# Patient Record
Sex: FEMALE | Race: WHITE | Employment: STUDENT | URBAN - NONMETROPOLITAN AREA
[De-identification: names, ages, dates, MRNs, and addresses within clinical notes are randomized per-mention and may not be internally consistent; named-entity substitution may affect disease eponyms.]

---

## 2017-09-07 ENCOUNTER — HOSPITAL ENCOUNTER (OUTPATIENT)
Dept: PHYSICAL THERAPY | Age: 27
Setting detail: THERAPIES SERIES
Discharge: HOME | End: 2017-09-07
Payer: COMMERCIAL

## 2017-09-07 PROCEDURE — 97161 PT EVAL LOW COMPLEX 20 MIN: CPT

## 2017-09-07 PROCEDURE — G8978 MOBILITY CURRENT STATUS: HCPCS

## 2017-09-07 PROCEDURE — 97110 THERAPEUTIC EXERCISES: CPT

## 2017-09-07 PROCEDURE — G8979 MOBILITY GOAL STATUS: HCPCS

## 2017-09-07 ASSESSMENT — PAIN SCALES - GENERAL
PAINLEVEL_OUTOF10: 1
PAINLEVEL_OUTOF10: 0

## 2017-09-07 ASSESSMENT — PAIN DESCRIPTION - ORIENTATION
ORIENTATION: RIGHT
ORIENTATION: RIGHT

## 2017-09-07 ASSESSMENT — PAIN DESCRIPTION - LOCATION
LOCATION: ANKLE
LOCATION: ANKLE

## 2017-09-07 ASSESSMENT — PAIN DESCRIPTION - DESCRIPTORS
DESCRIPTORS: DULL
DESCRIPTORS: DULL

## 2017-09-11 ENCOUNTER — HOSPITAL ENCOUNTER (OUTPATIENT)
Dept: PHYSICAL THERAPY | Age: 27
Setting detail: THERAPIES SERIES
Discharge: HOME | End: 2017-09-11
Payer: COMMERCIAL

## 2017-09-11 PROCEDURE — 97110 THERAPEUTIC EXERCISES: CPT

## 2017-09-13 ENCOUNTER — HOSPITAL ENCOUNTER (OUTPATIENT)
Dept: PHYSICAL THERAPY | Age: 27
Setting detail: THERAPIES SERIES
Discharge: HOME | End: 2017-09-13
Payer: COMMERCIAL

## 2017-09-13 PROCEDURE — 97140 MANUAL THERAPY 1/> REGIONS: CPT

## 2017-09-13 PROCEDURE — 97110 THERAPEUTIC EXERCISES: CPT

## 2017-09-13 ASSESSMENT — PAIN SCALES - GENERAL: PAINLEVEL_OUTOF10: 2

## 2017-09-13 ASSESSMENT — PAIN DESCRIPTION - LOCATION: LOCATION: ANKLE

## 2017-09-13 ASSESSMENT — PAIN DESCRIPTION - ORIENTATION: ORIENTATION: RIGHT

## 2017-09-15 ENCOUNTER — HOSPITAL ENCOUNTER (OUTPATIENT)
Dept: PHYSICAL THERAPY | Age: 27
Setting detail: THERAPIES SERIES
Discharge: HOME | End: 2017-09-15
Payer: COMMERCIAL

## 2017-09-15 PROCEDURE — G0283 ELEC STIM OTHER THAN WOUND: HCPCS

## 2017-09-15 PROCEDURE — 97140 MANUAL THERAPY 1/> REGIONS: CPT

## 2017-09-15 PROCEDURE — 97110 THERAPEUTIC EXERCISES: CPT

## 2017-09-15 ASSESSMENT — PAIN SCALES - GENERAL: PAINLEVEL_OUTOF10: 2

## 2017-09-15 ASSESSMENT — PAIN DESCRIPTION - ORIENTATION: ORIENTATION: RIGHT

## 2017-09-15 ASSESSMENT — PAIN DESCRIPTION - LOCATION: LOCATION: ANKLE

## 2017-09-18 ENCOUNTER — HOSPITAL ENCOUNTER (OUTPATIENT)
Dept: PHYSICAL THERAPY | Age: 27
Setting detail: THERAPIES SERIES
Discharge: HOME | End: 2017-09-18
Payer: COMMERCIAL

## 2017-09-18 PROCEDURE — G0283 ELEC STIM OTHER THAN WOUND: HCPCS

## 2017-09-18 PROCEDURE — 97140 MANUAL THERAPY 1/> REGIONS: CPT

## 2017-09-18 PROCEDURE — 97110 THERAPEUTIC EXERCISES: CPT

## 2017-09-18 ASSESSMENT — PAIN SCALES - GENERAL: PAINLEVEL_OUTOF10: 0

## 2017-09-19 ENCOUNTER — HOSPITAL ENCOUNTER (OUTPATIENT)
Dept: PHYSICAL THERAPY | Age: 27
Setting detail: THERAPIES SERIES
Discharge: HOME | End: 2017-09-19
Payer: COMMERCIAL

## 2017-09-19 PROCEDURE — G0283 ELEC STIM OTHER THAN WOUND: HCPCS

## 2017-09-19 PROCEDURE — 97110 THERAPEUTIC EXERCISES: CPT

## 2017-09-19 ASSESSMENT — PAIN SCALES - GENERAL: PAINLEVEL_OUTOF10: 2

## 2017-09-21 ENCOUNTER — APPOINTMENT (OUTPATIENT)
Dept: PHYSICAL THERAPY | Age: 27
End: 2017-09-21
Payer: COMMERCIAL

## 2017-09-25 ENCOUNTER — HOSPITAL ENCOUNTER (OUTPATIENT)
Dept: PHYSICAL THERAPY | Age: 27
Setting detail: THERAPIES SERIES
Discharge: HOME | End: 2017-09-25
Payer: COMMERCIAL

## 2017-09-25 PROCEDURE — 97110 THERAPEUTIC EXERCISES: CPT

## 2017-09-25 PROCEDURE — G0283 ELEC STIM OTHER THAN WOUND: HCPCS

## 2017-09-27 ENCOUNTER — HOSPITAL ENCOUNTER (OUTPATIENT)
Dept: PHYSICAL THERAPY | Age: 27
Setting detail: THERAPIES SERIES
Discharge: HOME | End: 2017-09-27
Payer: COMMERCIAL

## 2017-09-27 PROCEDURE — G0283 ELEC STIM OTHER THAN WOUND: HCPCS

## 2017-09-27 PROCEDURE — 97110 THERAPEUTIC EXERCISES: CPT

## 2017-09-27 PROCEDURE — 97140 MANUAL THERAPY 1/> REGIONS: CPT

## 2017-09-29 ENCOUNTER — HOSPITAL ENCOUNTER (OUTPATIENT)
Dept: PHYSICAL THERAPY | Age: 27
Setting detail: THERAPIES SERIES
Discharge: HOME | End: 2017-09-29
Payer: COMMERCIAL

## 2017-09-29 PROCEDURE — 97140 MANUAL THERAPY 1/> REGIONS: CPT

## 2017-09-29 PROCEDURE — 97110 THERAPEUTIC EXERCISES: CPT

## 2017-09-29 PROCEDURE — G0283 ELEC STIM OTHER THAN WOUND: HCPCS

## 2017-10-02 ENCOUNTER — HOSPITAL ENCOUNTER (OUTPATIENT)
Dept: PHYSICAL THERAPY | Age: 27
Setting detail: THERAPIES SERIES
Discharge: HOME | End: 2017-10-02
Payer: COMMERCIAL

## 2017-10-02 PROCEDURE — 97110 THERAPEUTIC EXERCISES: CPT

## 2017-10-02 PROCEDURE — G0283 ELEC STIM OTHER THAN WOUND: HCPCS

## 2017-10-02 NOTE — PROGRESS NOTES
met   Short term goal 2: Patient will demonstrate R ankle DF AROM 5 degrees past neutral in order to normalize gait pattern --met  []Met   []Partially met  []Not met      []Met   []Partially met  []Not met      []Met   []Partially met  []Not met     Long Term Goals - Time Frame for Long term goals : 4 weeks   Long term goal 1: Patient will demonstrate R ankle strength 4+/5 or greater in order to ease ambulating over uneven surfaces  []Met  []Partially met  []Not met   Long term goal 2: Patient will demonstrate R ankle AROM 0-45 degrees with knee extended in order to ease sport related activities  []Met  []Partially met  []Not met   Long term goal 3: Patient will demonstrate minimal to nil TTP around R calcaneofibular ligaments and tibiofibular ligaments in oder ease functional mobility -progressing []Met  []Partially met  []Not met   Long term goal 4: Patient will demonstrate the ability to perform agility/running activities for 15 minutes or greater with reports of 2/10 or less R ankle pain in order to return to playing soccer -progressing []Met  []Partially met  []Not met     []Met  []Partially met  []Not met       Minutes Tracking:  Time In: 4458  Time Out: Mikhail 72  Minutes: 70    Jose Baca Date: 10/2/2017

## 2017-10-04 ENCOUNTER — HOSPITAL ENCOUNTER (OUTPATIENT)
Dept: PHYSICAL THERAPY | Age: 27
Setting detail: THERAPIES SERIES
Discharge: HOME | End: 2017-10-04
Payer: COMMERCIAL

## 2017-10-04 PROCEDURE — G0283 ELEC STIM OTHER THAN WOUND: HCPCS

## 2017-10-04 PROCEDURE — 97140 MANUAL THERAPY 1/> REGIONS: CPT

## 2017-10-04 PROCEDURE — 97110 THERAPEUTIC EXERCISES: CPT

## 2017-10-06 ENCOUNTER — HOSPITAL ENCOUNTER (OUTPATIENT)
Dept: PHYSICAL THERAPY | Age: 27
Setting detail: THERAPIES SERIES
Discharge: HOME | End: 2017-10-06
Payer: COMMERCIAL

## 2017-10-06 PROCEDURE — 97110 THERAPEUTIC EXERCISES: CPT

## 2017-10-06 PROCEDURE — G0283 ELEC STIM OTHER THAN WOUND: HCPCS

## 2017-10-06 NOTE — PROGRESS NOTES
Phone: [facility]           Fax: [phone]                           Outpatient Physical Therapy                                                                            Daily Note    Patient: Willian Timmons : 1990  CSN #: [id number]   Referring Practitioner:  Dr. Tom Marin     Referral Date : 17     Date: 10/6/2017    Diagnosis: closed nondisplaced fracture of medial malleolus (S82.54XA), sprain of tibiofibular ligament right ankle (K87.201G), sprain of calcaneofibular ligment of right ankle (D17.069B)  Treatment Diagnosis: R ankle sprain     Onset Date: 17  PT Insurance Information: BCBS  Total # of Visits Approved: 18 Per Physician Order  Total # of Visits to Date: 11  No Show: 0      Pre-Treatment Pain:  0/10  Subjective: Pt denies current pain. Pt states she didnt have any pain following last therapy session. Exercises:     Exercise 2: airex 3way heel raises x15 (steps today)   Exercise 3: Baps board standing Level 3- PF/DF, CW/CCW [id number]      Exercise 5: Ellipitical 10min L^ (fwd 5 retro5)        Exercise 8: FSU/LSU/SD 8 in  x 15  Exercise 9: Squats on bosu ball x 15 /  balance and catch ball      Exercise 11: Leg Press DL 14pl  / SL 9pl  Exercise 12: SLS Blue Foam 30s x 3  Exercise 13: walking lunges 3 laps HEEL TO TOE WALK 3 LAPS --hold 5# ea hand   Exercise 14: TG mini jump L5 15x   Exercise 15: Retro TDM  8min 7% 2.5mph   Exercise 16: golfer  5# 10x      Modalities:      Cryotherapy (Minutes\Location): R ankle 15 minutes        E-stim (parameters): IFC + CP for post exercise pain/edema control        Assessment  Assessment: Agility ladder/ toe jumps/ light agility tolerated today 15+ mins with no pain reported R ankle. No palpable tenderness reported today with palpation of R lateral ankle. Functional strength displayed with ability to heel raise 6 pl on leg press 26x on R compared to 30 on L.   Open chain R ankle MMT 5/5 PF, 5/5 DF. 4+/5 INV, Eversion 4+/5. Pt to d/c from therapy d/t all goal being met. Patient Education  Reviewed ex for d/c.   Pt verbalized/demonstrated good understanding:     [x] Yes         [] No, pt required further clarification.     Post Treatment Pain:  0/10      Plan  Times per week: 3x/week   Plan weeks: 4 weeks       Goals  (Total # of Visits to Date: 6)   Short Term Goals - Time Frame for Short term goals: 2 weeks      Short term goal 1: Initiate HEP with good understanding-met                                         [x]Met   []Partially met  []Not met   Short term goal 2: Patient will demonstrate R ankle DF AROM 5 degrees past neutral in order to normalize gait pattern --met  [x]Met   []Partially met  []Not met      []Met   []Partially met  []Not met      []Met   []Partially met  []Not met     Long Term Goals - Time Frame for Long term goals : 4 weeks   Long term goal 1: Patient will demonstrate R ankle strength 4+/5 or greater in order to ease ambulating over uneven surfaces -MET [x]Met  []Partially met  []Not met   Long term goal 2: Patient will demonstrate R ankle AROM 0-45 degrees with knee extended in order to ease sport related activities _MET [x]Met  []Partially met  []Not met   Long term goal 3: Patient will demonstrate minimal to nil TTP around R calcaneofibular ligaments and tibiofibular ligaments in oder ease functional mobility -MET [x]Met  []Partially met  []Not met   Long term goal 4: Patient will demonstrate the ability to perform agility/running activities for 15 minutes or greater with reports of 2/10 or less R ankle pain in order to return to playing soccer -MET [x]Met  []Partially met  []Not met     []Met  []Partially met  []Not met       Minutes Tracking:  Time In: 1430  Time Out: Parish 70  Minutes: 100 Vivi Aldridge PTA    Date: 10/6/2017

## 2018-02-12 ENCOUNTER — HOSPITAL ENCOUNTER (OUTPATIENT)
Dept: PHYSICAL THERAPY | Age: 28
Setting detail: THERAPIES SERIES
Discharge: HOME | End: 2018-02-12
Payer: COMMERCIAL

## 2018-02-12 PROCEDURE — 97161 PT EVAL LOW COMPLEX 20 MIN: CPT

## 2018-02-12 PROCEDURE — G8984 CARRY CURRENT STATUS: HCPCS

## 2018-02-12 PROCEDURE — G8985 CARRY GOAL STATUS: HCPCS

## 2018-02-12 PROCEDURE — 97110 THERAPEUTIC EXERCISES: CPT

## 2018-02-12 NOTE — PROGRESS NOTES
Phone: [address]          Fax: [phone]                      Outpatient Physical Therapy                                                                      Evaluation  Date: 2018  Patient: Judythe Runner  : 1990  CSN #: [id number]  Referring Practitioner: Masood Watson. Judie Hernandez MD    Referral Date : 18     Diagnosis: Right biceps tendonitis, M75.21    Treatment Diagnosis: Right biceps tendonitis  Onset Date: 10/12/09  PT Insurance Information: Pradeep Palafox  Total # of Visits Approved: 18   Total # of Visits to Date: 1  No Show: 0  Canceled Appointment: 0     Subjective  Subjective: Patient reports injuring her shoulders 8 years ago when grabbing for her dogs collar, jerking her shoulder forward and across her body. 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 Patient reports pain ranges from 4/10 to 9/10 at worst.  Aggravating factors include: front and lateral deltoid raises, sleeping, standing/walking for long periods with arm at her side. Patient reports relief following steriod dose pack.   Additional Pertinent Hx: History of bilateral knee scopes, right medial malleolus fracture             Objective     Observation/Palpation  Posture: Fair  Palpation: Patient exquisitely tender at right biceps tendon, right subscapularis, latissimus dorsi, and infraspinatus  Body Mechanics: Patient sits with slouched, rounded shoulder posture  Spine  Cervical: WNL  Joint Mobility  Spine: Decreased upper thoracic joint mobility  Strength RUE  Strength RUE: Exception  R Shoulder Flexion: 4/5  R Shoulder ABduction: 4/5  R Shoulder Internal Rotation: 4/5  R Shoulder External Rotation: 3+/5  R Elbow Flexion: 4+/5     Additional Measures  Special Tests: +belly press, +Karime's, audible clunk when moving from IR to ER     Assessment  Body structures, Functions, Activity limitations: Decreased ROM, Decreased strength, Decreased endurance  Assessment: The patient is a 24 y.o. female who reports a long standing history of right shoulder pain which became severe over the past 2 weeks when progressing her training. Patient presents with a possible labral pathology and was tender to palpation and to resistance of rotator cuff musculature. Patient also presents with impaired right shoulder ROM and strength. Patient would benefit from skilled physical therapy for modalities, manual techniques, and an exercise progression to decrease pain and improve overall shoulder strength and stability  Prognosis: Fair        Decision Making: Low Complexity    Patient Education  Patient educated on POC and exercise rationale  Pt verbalized/demonstrated good understanding:     [x] Yes         [] No, pt required further clarification. [] Primary Impairment :   G Code:    [] Mobility         [x] Carry        [] Body Position       [] Self Care      [] Other:   Functional Impairment Current:  [] 0%    [] 1-19% [x] 20-39% [] 40-59% [] 60-79%    [] 80-99% [] 100%  Functional Impairment Goal:  [] 0%    [x] 1-19% [] 20-39% [] 40-59% [] 60-79%    [] 80-99% [] 100%  G Code Functional Impairment determined by:  [x] Clinical Judgment   [] Outcome Measure:     Goals  Short term goals  Time Frame for Short term goals: 3 weeks  Short term goal 1: Patient will be initiated with a HEP  Short term goal 2: Patient will report a 25% reduction in pain levels. Long term goals  Time Frame for Long term goals : 6 weeks  Long term goal 1: Patient will improve right shoulder strength to 5/5 for ADLs.     Long term goal 2: Patient will improve right shoulder external rotation ROM to 90 degrees for ADL performance  Long term goal 3: Patient will report decreased pain to <4/10 at worst.      Patient goals : Decrease pain and improve right shoulder strength        Minutes Tracking:  Time In: 1605  Time Out: [address]  Minutes: [address] [place], [place],

## 2018-02-12 NOTE — PLAN OF CARE
[facility]           Phone: [phone]             Outpatient Physical Therapy  Fax: [phone]                                           Date: 2018  Patient: Jefferson Bland : 1990 [facility] #: [id number]   Referring Practitioner:  Jovanna Morales MD Referral Date:  18       [x] Plan of Care   [] Updated Plan of Care    Dates of Service to Include: 2018 to 18    Diagnosis:  Right biceps tendonitis, M75.21    Rehab (Treatment) Diagnosis:  Right biceps tendonitis             Onset Date:  10/12/09    Attendance  Total # of Visits to Date: 1 No Show: 0 Canceled Appointment: 0    Assessment  Body structures, Functions, Activity limitations: Decreased ROM, Decreased strength, Decreased endurance  Assessment: The patient is a 24 y.o. female who reports a long standing history of right shoulder pain which became severe over the past 2 weeks when progressing her training. Patient presents with a possible labral pathology and was tender to palpation and to resistance of rotator cuff musculature. Patient also presents with impaired right shoulder ROM and strength.  Patient would benefit from skilled physical therapy for modalities, manual techniques, and an exercise progression to decrease pain and improve overall shoulder strength and stability    [] Primary Impairment :   G Code:    [] Mobility         [x] Carry        [] Body Position       [] Self Care      [] Other:   Functional Impairment Current:  [] 0%    [] 1-19% [x] 20-39% [] 40-59% [] 60-79%    [] 80-99% [] 100%  Functional Impairment Goal:  [] 0%    [x] 1-19% [] 20-39% [] 40-59% [] 60-79%    [] 80-99% [] 100%  G Code Functional Impairment determined by:  [x] Clinical Judgment   [] Outcome Measure:     Goals  Short term goals  Time Frame for Short term goals: 3 weeks  Short term goal 1: Patient will be initiated with a HEP  Short term goal
PT/OT INITIAL EVALUATION REPORT   Waldo Acevedo Date: 2/12/2018     Insurance Company:___________  Patient Name: Daniela Zapata                                          Patient ID #: __________________   Date of Birth / Age: 1990/ 24 y.o. Date Of Injury:  /  /   Date Of Surgery:  /  /   ICD-10 Code(s): ___M75.21______________ Diagnosis: Right biceps tendonitis, M75.21  Referring Practitioner: Stanislaw Grace. Suzanne Ring MD  Referring Physician ID #:_______________   Therapy Office: [address] [place] [address]: []PT / []OT       OBJECTIVE FINDINGS    Involved Region: []Left / [x]Right / []N/A  Shoulder    Strength ( 0-5 )       Range of Motion   Motion  /  Grade     PROM    AROM    Right shoulder flexion 4/5    R shoulder flexion: 152    Right shoulder abduction 4/5    R shoulder abduction: 180    Right shoulder internal rotation 4/5    R shoulder external rotation: 75   Right shoulder external rotation 3+/5    Right elbow flexion: 4+/5    Right shoulder internal rotation 70     How / Where Injury Occurred: Patient reports injuring her shoulders 8 years ago when grabbing for her dogs collar, jerking her shoulder forward and across her body. She reports she's had pain on and off since, but pain became severe 2 weeks when exercising. Patient reports pain ranges from 4/10 to 9/10 at worst.  Aggravating factors include: front and lateral deltoid raises, sleeping, standing/walking for long periods with arm at her side. She reports relief following steriod dose pack. Work Related?   []Yes  [x]No      Pertinent History: Additional Pertinent Hx: History of bilateral knee scopes, right medial malleolus fracture    Pain:  Pain Scale:  9 /10     Nature: []constant / [x]intermittent / []localized / []radiating     Functional Deficits / Additional Information:   The patient is a 24 y.o. female who reports a long standing history of right shoulder pain which became severe over the past 2 weeks
Patient will report a 25% reduction in pain levels. Long term goals  Time Frame for Long term goals : 6 weeks  Long term goal 1: Patient will improve right shoulder strength to 5/5 for ADLs.     Long term goal 2: Patient will improve right shoulder external rotation ROM to 90 degrees for ADL performance  Long term goal 3: Patient will report decreased pain to <4/10 at worst.    Prognosis  Prognosis: [address]   Times per week: 3  Plan weeks: 6  [x]HP/CP      [x]Electrical Stim   [x]Therapeutic Exercise      []Gait Training  []Aquatics   [x]Ultrasound         [x]Patient Education/HEP   [x]Manual Therapy  []Traction    [x]Neuro-devin        [x]Soft Tissue Mobs            []Home TENS  []Iontophoresis    []Orthotic casting/fitting      []Dry Needling             Electronically signed by: Sandy Haley PT, DPT    Date: 2/12/2018      ______________________________________ Date: 2/12/2018   Physician Signature

## 2018-02-15 ENCOUNTER — HOSPITAL ENCOUNTER (OUTPATIENT)
Dept: PHYSICAL THERAPY | Age: 28
Setting detail: THERAPIES SERIES
Discharge: HOME | End: 2018-02-15
Payer: COMMERCIAL

## 2018-02-15 PROCEDURE — 97110 THERAPEUTIC EXERCISES: CPT

## 2018-02-15 PROCEDURE — 97035 APP MDLTY 1+ULTRASOUND EA 15: CPT

## 2018-02-16 ENCOUNTER — HOSPITAL ENCOUNTER (OUTPATIENT)
Dept: PHYSICAL THERAPY | Age: 28
Setting detail: THERAPIES SERIES
Discharge: HOME | End: 2018-02-16
Payer: COMMERCIAL

## 2018-02-16 PROCEDURE — 97110 THERAPEUTIC EXERCISES: CPT

## 2018-02-16 PROCEDURE — 97140 MANUAL THERAPY 1/> REGIONS: CPT

## 2018-02-16 PROCEDURE — 97035 APP MDLTY 1+ULTRASOUND EA 15: CPT

## 2018-02-16 NOTE — PROGRESS NOTES
clarification. Post Treatment Pain:  1/10      Plan  Times per week: 3  Plan weeks: 6      Goals  (Total # of Visits to Date: 3)   Short Term Goals - Time Frame for Short term goals: 3 weeks    Short term goal 1: Patient will be initiated with a HEP- MET                                        [x]Met   []Partially met  []Not met   Short term goal 2: Patient will report a 25% reduction in pain levels - MET  [x]Met   []Partially met  []Not met      []Met   []Partially met  []Not met      []Met   []Partially met  []Not met     Long Term Goals - Time Frame for Long term goals : 6 weeks  Long term goal 1: Patient will improve right shoulder strength to 5/5 for ADLs.    []Met  []Partially met  []Not met   Long term goal 2: Patient will improve right shoulder external rotation ROM to 90 degrees for ADL performance []Met  []Partially met  []Not met   Long term goal 3: Patient will report decreased pain to <4/10 at worst. []Met  []Partially met  []Not met     []Met  []Partially met  []Not met     []Met  []Partially met  []Not met       Minutes Tracking:  Time In: 1145  Time Out: Aman Devi  Minutes: 450 NADER Stanley PT, DPT Date: 2/16/2018

## 2018-02-19 ENCOUNTER — HOSPITAL ENCOUNTER (OUTPATIENT)
Dept: PHYSICAL THERAPY | Age: 28
Setting detail: THERAPIES SERIES
Discharge: HOME | End: 2018-02-19
Payer: COMMERCIAL

## 2018-02-19 PROCEDURE — 97110 THERAPEUTIC EXERCISES: CPT

## 2018-02-19 PROCEDURE — 97140 MANUAL THERAPY 1/> REGIONS: CPT

## 2018-02-19 PROCEDURE — 97035 APP MDLTY 1+ULTRASOUND EA 15: CPT

## 2018-02-19 PROCEDURE — G0283 ELEC STIM OTHER THAN WOUND: HCPCS

## 2018-02-21 ENCOUNTER — HOSPITAL ENCOUNTER (OUTPATIENT)
Dept: PHYSICAL THERAPY | Age: 28
Setting detail: THERAPIES SERIES
Discharge: HOME | End: 2018-02-21
Payer: COMMERCIAL

## 2018-02-21 PROCEDURE — 97035 APP MDLTY 1+ULTRASOUND EA 15: CPT

## 2018-02-21 PROCEDURE — G0283 ELEC STIM OTHER THAN WOUND: HCPCS

## 2018-02-21 PROCEDURE — 97110 THERAPEUTIC EXERCISES: CPT

## 2018-02-22 NOTE — PROGRESS NOTES
Phone: [facility]           Fax: [phone]                           Outpatient Physical Therapy                                                                            Daily Note    Patient: Jami Prader : 1990  CSN #: [id number]   Referring Practitioner:  Racheal Dunaway. Uyen Mclean MD    Referral Date : 18     Date: 2018    Diagnosis: Right biceps tendonitis, M75.21  Treatment Diagnosis: Right biceps tendonitis    Onset Date: 10/12/09  PT Insurance Information: Win Acosta  Total # of Visits Approved: 18 Per Physician Order  Total # of Visits to Date: 5  No Show: 0  Canceled Appointment: 0      Pre-Treatment Pain:  5/10  Subjective: Pt states should pain/soreness is 2-3/10. Sore from exercise class this morning doing wind up squats. Pt reports celi. last session without c/o pain afterwards. Exercises:     Exercise 2: T-band --IR/ER shld exten and rows 2x10 ea --red   Exercise 3: sidlying ER 3# 15x2 ,  Exercise 4: joystick--smal circles cw/ccw  Exercise 5: UBE, 90rpm, 3min fwd/3 min backward  Exercise 6: shoulder extension with stick 2x10  Exercise 7: Pulley: shoulder flexion x 2 min  Exercise 8: Therapy ball roll up wall x 30seconds  Exercise 9: Posterior capsule stretch 2x30 seconds                                                         Modalities:          Ultrasound: ant shld 1mhz, 0.8 w/cm2--8 min    E-stim (parameters): ifc/cp for pain                  Assessment  Assessment: Pain about a 4-5/10. Still having anterior shld pain. RTC and scapular strengthening tolerated well with mild progressions. Strength blimited to 4-/4/5 due to pain. Impingment zones reviweded     Patient Education  Impingement zones   Pt verbalized/demonstrated good understanding:     [x] Yes         [] No, pt required further clarification.     Post Treatment Pain:  4/10      Plan  Times per week: 3  Plan weeks: 6      Goals  (Total # of Visits to Date: 5)   Short Term

## 2018-02-23 ENCOUNTER — HOSPITAL ENCOUNTER (OUTPATIENT)
Dept: PHYSICAL THERAPY | Age: 28
Setting detail: THERAPIES SERIES
Discharge: HOME | End: 2018-02-23
Payer: COMMERCIAL

## 2018-02-23 PROCEDURE — 97110 THERAPEUTIC EXERCISES: CPT

## 2018-02-23 PROCEDURE — G0283 ELEC STIM OTHER THAN WOUND: HCPCS

## 2018-02-23 NOTE — PROGRESS NOTES
Phone: [facility]           Fax: [phone]                           Outpatient Physical Therapy                                                                            Daily Note    Patient: Ciro Kohler : 1990  CSN #: [id number]   Referring Practitioner:  Saira Handy. Adele Lofton MD    Referral Date : 18     Date: 2018    Diagnosis: Right biceps tendonitis, M75.21  Treatment Diagnosis: Right biceps tendonitis    Onset Date: 10/12/09  PT Insurance Information: Paris Do  Total # of Visits Approved: 18 Per Physician Order  Total # of Visits to Date: 6  No Show: 0  Canceled Appointment: 0      Pre-Treatment Pain:  3/10  Subjective: Patient reports soreness from rolling on her shoulder this morning. She reports 2-3/10 pain coming into therapy. Exercises:     Exercise 2: T-band --IR/ER shld exten and rows 2x10 ea --red      Exercise 4: joystick--smal circles cw/ccw     Exercise 6: shoulder extension with stick 2x10  Exercise 7: Pulley: shoulder flexion x 2 min  Exercise 8: Therapy ball roll up wall x 30seconds     Exercise 10: Median nerve glide x10  Exercise 11: SciFit, UE only, 5 min, level 2  Exercise 12: Knee plank, 2x30 seconds, arms extended  Exercise 13: [place] carry, 10# (2) 3x50'    Modalities:              E-stim (parameters): ifc/cp for pain x15 minutes                 Assessment  Body structures, Functions, Activity limitations: Decreased ROM, Decreased strength, Decreased endurance  Assessment: Patient progressed with planks to improve shoulder proprioception. Patient reported burning sensation with most exercises which abolished with rest.  Used E-Stim and ice to decrease discomfort    Patient Education  Patient educated on exercise rational  Pt verbalized/demonstrated good understanding:     [x] Yes         [] No, pt required further clarification.     Post Treatment Pain:  1/10      Plan  Times per week: 3  Plan weeks: 6      Goals

## 2018-02-26 ENCOUNTER — HOSPITAL ENCOUNTER (OUTPATIENT)
Dept: PHYSICAL THERAPY | Age: 28
Setting detail: THERAPIES SERIES
Discharge: HOME | End: 2018-02-26
Payer: COMMERCIAL

## 2018-02-26 PROCEDURE — G0283 ELEC STIM OTHER THAN WOUND: HCPCS

## 2018-02-26 PROCEDURE — 97110 THERAPEUTIC EXERCISES: CPT

## 2018-02-26 PROCEDURE — 97140 MANUAL THERAPY 1/> REGIONS: CPT

## 2018-02-26 ASSESSMENT — PAIN SCALES - GENERAL: PAINLEVEL_OUTOF10: 1

## 2018-02-26 NOTE — PROGRESS NOTES
stim tolerated well. Patient Education  Reviewed HEP and proper body posture during studying  Pt verbalized/demonstrated good understanding:     [x] Yes         [] No, pt required further clarification. Post Treatment Pain:  1/10      Plan  Times per week: 3  Plan weeks: 6      Goals  (Total # of Visits to Date: 7)   Short Term Goals - Time Frame for Short term goals: 3 weeks     Short term goal 1: Patient will be initiated with a HEP- MET                                        []Met   []Partially met  []Not met   Short term goal 2: Patient will report a 25% reduction in pain levels - MET  []Met   []Partially met  []Not met      []Met   []Partially met  []Not met      []Met   []Partially met  []Not met     Long Term Goals - Time Frame for Long term goals : 6 weeks  Long term goal 1: Patient will improve right shoulder strength to 5/5 for ADLs.    []Met  []Partially met  []Not met   Long term goal 2: Patient will improve right shoulder external rotation ROM to 90 degrees for ADL performance []Met  []Partially met  []Not met   Long term goal 3: Patient will report decreased pain to <4/10 at worst. []Met  []Partially met  []Not met     []Met  []Partially met  []Not met     []Met  []Partially met  []Not met       Minutes Tracking:  Time In: 1500  Time Out: [address] [facility]  Minutes: 75    Jose Jackson PTA Date: 2/26/2018

## 2018-02-28 ENCOUNTER — HOSPITAL ENCOUNTER (OUTPATIENT)
Dept: PHYSICAL THERAPY | Age: 28
Setting detail: THERAPIES SERIES
Discharge: HOME | End: 2018-02-28
Payer: COMMERCIAL

## 2018-02-28 PROCEDURE — G0283 ELEC STIM OTHER THAN WOUND: HCPCS

## 2018-02-28 PROCEDURE — 97035 APP MDLTY 1+ULTRASOUND EA 15: CPT

## 2018-02-28 PROCEDURE — 97110 THERAPEUTIC EXERCISES: CPT

## 2018-02-28 ASSESSMENT — PAIN SCALES - GENERAL: PAINLEVEL_OUTOF10: 2

## 2018-03-01 ENCOUNTER — HOSPITAL ENCOUNTER (OUTPATIENT)
Dept: PHYSICAL THERAPY | Age: 28
Setting detail: THERAPIES SERIES
Discharge: HOME | End: 2018-03-01
Payer: COMMERCIAL

## 2018-03-01 PROCEDURE — G0283 ELEC STIM OTHER THAN WOUND: HCPCS

## 2018-03-01 PROCEDURE — 97110 THERAPEUTIC EXERCISES: CPT

## 2018-03-02 ENCOUNTER — APPOINTMENT (OUTPATIENT)
Dept: PHYSICAL THERAPY | Age: 28
End: 2018-03-02
Payer: COMMERCIAL

## 2018-03-05 ENCOUNTER — APPOINTMENT (OUTPATIENT)
Dept: PHYSICAL THERAPY | Age: 28
End: 2018-03-05
Payer: COMMERCIAL

## 2018-03-07 ENCOUNTER — APPOINTMENT (OUTPATIENT)
Dept: PHYSICAL THERAPY | Age: 28
End: 2018-03-07
Payer: COMMERCIAL

## 2018-03-09 ENCOUNTER — APPOINTMENT (OUTPATIENT)
Dept: PHYSICAL THERAPY | Age: 28
End: 2018-03-09
Payer: COMMERCIAL

## 2018-03-12 ENCOUNTER — APPOINTMENT (OUTPATIENT)
Dept: PHYSICAL THERAPY | Age: 28
End: 2018-03-12
Payer: COMMERCIAL

## 2018-03-14 ENCOUNTER — HOSPITAL ENCOUNTER (OUTPATIENT)
Dept: PHYSICAL THERAPY | Age: 28
Setting detail: THERAPIES SERIES
Discharge: HOME | End: 2018-03-14
Payer: COMMERCIAL

## 2018-03-14 PROCEDURE — 97140 MANUAL THERAPY 1/> REGIONS: CPT

## 2018-03-14 PROCEDURE — G0283 ELEC STIM OTHER THAN WOUND: HCPCS

## 2018-03-14 PROCEDURE — 97110 THERAPEUTIC EXERCISES: CPT

## 2018-03-16 ENCOUNTER — HOSPITAL ENCOUNTER (OUTPATIENT)
Dept: PHYSICAL THERAPY | Age: 28
Setting detail: THERAPIES SERIES
Discharge: HOME | End: 2018-03-16
Payer: COMMERCIAL

## 2018-03-16 PROCEDURE — G0283 ELEC STIM OTHER THAN WOUND: HCPCS

## 2018-03-16 PROCEDURE — 97035 APP MDLTY 1+ULTRASOUND EA 15: CPT

## 2018-03-16 PROCEDURE — 97140 MANUAL THERAPY 1/> REGIONS: CPT

## 2018-03-16 PROCEDURE — 97110 THERAPEUTIC EXERCISES: CPT

## 2018-03-19 ENCOUNTER — HOSPITAL ENCOUNTER (OUTPATIENT)
Dept: PHYSICAL THERAPY | Age: 28
Setting detail: THERAPIES SERIES
Discharge: HOME | End: 2018-03-19
Payer: COMMERCIAL

## 2018-03-19 PROCEDURE — 97035 APP MDLTY 1+ULTRASOUND EA 15: CPT

## 2018-03-19 PROCEDURE — 97110 THERAPEUTIC EXERCISES: CPT

## 2018-03-19 PROCEDURE — G0283 ELEC STIM OTHER THAN WOUND: HCPCS

## 2018-03-19 PROCEDURE — 97140 MANUAL THERAPY 1/> REGIONS: CPT

## 2018-03-19 ASSESSMENT — PAIN SCALES - GENERAL: PAINLEVEL_OUTOF10: 8

## 2018-03-19 NOTE — PROGRESS NOTES
stretches. Pt verbalized/demonstrated good understanding:     [x] Yes         [] No, pt required further clarification. Post Treatment Pain:  4/10      Plan  Times per week: 3  Plan weeks: 6      Goals  (Total # of Visits to Date: 15)   Short Term Goals - Time Frame for Short term goals: 3 weeks     Short term goal 1: Patient will be initiated with a HEP- MET                                        []Met   []Partially met  []Not met   Short term goal 2: Patient will report a 25% reduction in pain levels - MET  []Met   []Partially met  []Not met      []Met   []Partially met  []Not met      []Met   []Partially met  []Not met     Long Term Goals - Time Frame for Long term goals : 6 weeks  Long term goal 1: Patient will improve right shoulder strength to 5/5 for ADLs.    []Met  []Partially met  []Not met   Long term goal 2: Patient will improve right shoulder external rotation ROM to 90 degrees for ADL performance []Met  []Partially met  []Not met   Long term goal 3: Patient will report decreased pain to <4/10 at worst. []Met  []Partially met  []Not met     []Met  []Partially met  []Not met     []Met  []Partially met  []Not met       Minutes Tracking:  Time In: 1200  Time Out: 1309  Minutes: Jay Funes ,PTA Date: 3/19/2018

## 2018-03-21 ENCOUNTER — HOSPITAL ENCOUNTER (OUTPATIENT)
Dept: PHYSICAL THERAPY | Age: 28
Setting detail: THERAPIES SERIES
Discharge: HOME | End: 2018-03-21
Payer: COMMERCIAL

## 2018-03-21 PROCEDURE — 97140 MANUAL THERAPY 1/> REGIONS: CPT

## 2018-03-21 PROCEDURE — 97035 APP MDLTY 1+ULTRASOUND EA 15: CPT

## 2018-03-21 PROCEDURE — 97110 THERAPEUTIC EXERCISES: CPT

## 2018-03-21 PROCEDURE — G0283 ELEC STIM OTHER THAN WOUND: HCPCS

## 2018-03-21 NOTE — PROGRESS NOTES
Phone: [facility]           Fax: [phone]                           Outpatient Physical Therapy                                                                            Daily Note    Patient: Elba Huntley : 1990  CSN #: [id number]   Referring Practitioner:  Marito Peterson. Rolando Venegas MD    Referral Date : 18     Date: 3/21/2018    Diagnosis: Right biceps tendonitis, M75.21  Treatment Diagnosis: Right biceps tendonitis    Onset Date: 10/12/09  PT Insurance Information: Hershall Barthel  Total # of Visits Approved: 18 Per Physician Order  Total # of Visits to Date: 13  No Show: 0  Canceled Appointment: 0      Pre-Treatment Pain:  3/10  Subjective: Pt states she is feeling alot better today but still has pain. Pt to RTD following therapy today. Current R shld pain 3/10. Exercises:  Exercise 1: HEP: Posterior capsule stretch in pain-free ROM, therapy ball circles x 30 each, therapy ball rollout x10  Exercise 2: T-band exten and rows 2x15 ea -- Green    Exercise 3: HAB/ Diags yTB 15x ea   Exercise 4: joystick--small circles cw/ccw, push/pull x 20 each  Exercise 5: UBE, 90rpm, 4min fwd/4 min backward - backward only today       Exercise 10: Median nerve glide x10       Manual:     PROM: R shld PROM./ R pec stretch      Modalities:      Ultrasound: ant shld 1mhz, 0.8 w/cm2--8 min    E-stim (parameters): ifc/cp for pain x15 minutes        Assessment  Assessment: Pt current R shld Flex AROM 163* with 4+/5 MMT, ABD AROM 175* with 4/5 MMT. Pt with full R shld PROM. Patient Education  Cont current stretches/ Cont HEP. Pt verbalized/demonstrated good understanding:     [x] Yes         [] No, pt required further clarification.     Post Treatment Pain:  3/10      Plan  Times per week: 3  Plan weeks: 6      Goals  (Total # of Visits to Date: 15)   Short Term Goals - Time Frame for Short term goals: 3 weeks     Short term goal 1: Patient will be initiated with a HEP-

## 2018-03-23 ENCOUNTER — APPOINTMENT (OUTPATIENT)
Dept: PHYSICAL THERAPY | Age: 28
End: 2018-03-23
Payer: COMMERCIAL

## 2018-03-26 ENCOUNTER — APPOINTMENT (OUTPATIENT)
Dept: PHYSICAL THERAPY | Age: 28
End: 2018-03-26
Payer: COMMERCIAL

## 2018-03-28 ENCOUNTER — HOSPITAL ENCOUNTER (OUTPATIENT)
Dept: PHYSICAL THERAPY | Age: 28
Setting detail: THERAPIES SERIES
End: 2018-03-28
Payer: COMMERCIAL

## 2018-03-30 ENCOUNTER — APPOINTMENT (OUTPATIENT)
Dept: PHYSICAL THERAPY | Age: 28
End: 2018-03-30
Payer: COMMERCIAL

## 2018-04-19 ENCOUNTER — ANESTHESIA EVENT (OUTPATIENT)
Dept: OPERATING ROOM | Age: 28
End: 2018-04-19
Payer: COMMERCIAL

## 2018-04-20 ENCOUNTER — ANESTHESIA (OUTPATIENT)
Dept: OPERATING ROOM | Age: 28
End: 2018-04-20
Payer: COMMERCIAL

## 2018-04-20 ENCOUNTER — HOSPITAL ENCOUNTER (OUTPATIENT)
Age: 28
Setting detail: OUTPATIENT SURGERY
Discharge: HOME | End: 2018-04-20
Attending: ORTHOPAEDIC SURGERY | Admitting: ORTHOPAEDIC SURGERY
Payer: COMMERCIAL

## 2018-04-20 VITALS
HEIGHT: 67 IN | BODY MASS INDEX: 26.37 KG/M2 | OXYGEN SATURATION: 97 % | TEMPERATURE: 97.2 F | HEART RATE: 60 BPM | RESPIRATION RATE: 18 BRPM | DIASTOLIC BLOOD PRESSURE: 60 MMHG | SYSTOLIC BLOOD PRESSURE: 108 MMHG | WEIGHT: 168 LBS

## 2018-04-20 VITALS
RESPIRATION RATE: 15 BRPM | SYSTOLIC BLOOD PRESSURE: 87 MMHG | DIASTOLIC BLOOD PRESSURE: 51 MMHG | OXYGEN SATURATION: 100 % | TEMPERATURE: 52 F

## 2018-04-20 DIAGNOSIS — S43.431A TEAR OF RIGHT GLENOID LABRUM, INITIAL ENCOUNTER: Primary | ICD-10-CM

## 2018-04-20 LAB — HCG(URINE) PREGNANCY TEST: NEGATIVE

## 2018-04-20 PROCEDURE — 2580000003 HC RX 258: Performed by: NURSE ANESTHETIST, CERTIFIED REGISTERED

## 2018-04-20 PROCEDURE — C1713 ANCHOR/SCREW BN/BN,TIS/BN: HCPCS | Performed by: ORTHOPAEDIC SURGERY

## 2018-04-20 PROCEDURE — 64415 NJX AA&/STRD BRCH PLXS IMG: CPT | Performed by: NURSE ANESTHETIST, CERTIFIED REGISTERED

## 2018-04-20 PROCEDURE — 7100000011 HC PHASE II RECOVERY - ADDTL 15 MIN: Performed by: ORTHOPAEDIC SURGERY

## 2018-04-20 PROCEDURE — 6370000000 HC RX 637 (ALT 250 FOR IP): Performed by: ORTHOPAEDIC SURGERY

## 2018-04-20 PROCEDURE — 7100000000 HC PACU RECOVERY - FIRST 15 MIN: Performed by: ORTHOPAEDIC SURGERY

## 2018-04-20 PROCEDURE — A6454 SELF-ADHER BAND W>=3" <5"/YD: HCPCS | Performed by: ORTHOPAEDIC SURGERY

## 2018-04-20 PROCEDURE — 6360000002 HC RX W HCPCS: Performed by: NURSE ANESTHETIST, CERTIFIED REGISTERED

## 2018-04-20 PROCEDURE — 2720000010 HC SURG SUPPLY STERILE: Performed by: ORTHOPAEDIC SURGERY

## 2018-04-20 PROCEDURE — 6360000002 HC RX W HCPCS: Performed by: ORTHOPAEDIC SURGERY

## 2018-04-20 PROCEDURE — 2500000003 HC RX 250 WO HCPCS: Performed by: NURSE ANESTHETIST, CERTIFIED REGISTERED

## 2018-04-20 PROCEDURE — 84703 CHORIONIC GONADOTROPIN ASSAY: CPT

## 2018-04-20 PROCEDURE — 2580000003 HC RX 258: Performed by: ORTHOPAEDIC SURGERY

## 2018-04-20 PROCEDURE — 3700000001 HC ADD 15 MINUTES (ANESTHESIA): Performed by: ORTHOPAEDIC SURGERY

## 2018-04-20 PROCEDURE — 3600000004 HC SURGERY LEVEL 4 BASE: Performed by: ORTHOPAEDIC SURGERY

## 2018-04-20 PROCEDURE — 3600000014 HC SURGERY LEVEL 4 ADDTL 15MIN: Performed by: ORTHOPAEDIC SURGERY

## 2018-04-20 PROCEDURE — 7100000001 HC PACU RECOVERY - ADDTL 15 MIN: Performed by: ORTHOPAEDIC SURGERY

## 2018-04-20 PROCEDURE — 3700000000 HC ANESTHESIA ATTENDED CARE: Performed by: ORTHOPAEDIC SURGERY

## 2018-04-20 PROCEDURE — 7100000010 HC PHASE II RECOVERY - FIRST 15 MIN: Performed by: ORTHOPAEDIC SURGERY

## 2018-04-20 DEVICE — ANCHOR SUT L12.5MM DIA2.9MM SHT SHLDR BIOCOMPOSITE PUSHLOCK: Type: IMPLANTABLE DEVICE | Site: SHOULDER | Status: FUNCTIONAL

## 2018-04-20 RX ORDER — DEXAMETHASONE SODIUM PHOSPHATE 4 MG/ML
INJECTION, SOLUTION INTRA-ARTICULAR; INTRALESIONAL; INTRAMUSCULAR; INTRAVENOUS; SOFT TISSUE PRN
Status: DISCONTINUED | OUTPATIENT
Start: 2018-04-20 | End: 2018-04-20 | Stop reason: SDUPTHER

## 2018-04-20 RX ORDER — LIDOCAINE HYDROCHLORIDE 20 MG/ML
INJECTION, SOLUTION EPIDURAL; INFILTRATION; INTRACAUDAL; PERINEURAL PRN
Status: DISCONTINUED | OUTPATIENT
Start: 2018-04-20 | End: 2018-04-20 | Stop reason: SDUPTHER

## 2018-04-20 RX ORDER — SODIUM CHLORIDE, SODIUM LACTATE, POTASSIUM CHLORIDE, CALCIUM CHLORIDE 600; 310; 30; 20 MG/100ML; MG/100ML; MG/100ML; MG/100ML
INJECTION, SOLUTION INTRAVENOUS CONTINUOUS PRN
Status: DISCONTINUED | OUTPATIENT
Start: 2018-04-20 | End: 2018-04-20 | Stop reason: SDUPTHER

## 2018-04-20 RX ORDER — MIDAZOLAM HYDROCHLORIDE 1 MG/ML
INJECTION INTRAMUSCULAR; INTRAVENOUS PRN
Status: DISCONTINUED | OUTPATIENT
Start: 2018-04-20 | End: 2018-04-20 | Stop reason: SDUPTHER

## 2018-04-20 RX ORDER — ROPIVACAINE HYDROCHLORIDE 5 MG/ML
INJECTION, SOLUTION EPIDURAL; INFILTRATION; PERINEURAL PRN
Status: DISCONTINUED | OUTPATIENT
Start: 2018-04-20 | End: 2018-04-20 | Stop reason: SDUPTHER

## 2018-04-20 RX ORDER — HYDROCODONE BITARTRATE AND ACETAMINOPHEN 5; 325 MG/1; MG/1
TABLET ORAL
Qty: 30 TABLET | Refills: 0 | Status: SHIPPED | OUTPATIENT
Start: 2018-04-20 | End: 2018-04-27

## 2018-04-20 RX ORDER — SODIUM CHLORIDE, SODIUM LACTATE, POTASSIUM CHLORIDE, CALCIUM CHLORIDE 600; 310; 30; 20 MG/100ML; MG/100ML; MG/100ML; MG/100ML
INJECTION, SOLUTION INTRAVENOUS CONTINUOUS
Status: DISCONTINUED | OUTPATIENT
Start: 2018-04-20 | End: 2018-04-20 | Stop reason: HOSPADM

## 2018-04-20 RX ORDER — FENTANYL CITRATE 50 UG/ML
INJECTION, SOLUTION INTRAMUSCULAR; INTRAVENOUS PRN
Status: DISCONTINUED | OUTPATIENT
Start: 2018-04-20 | End: 2018-04-20 | Stop reason: SDUPTHER

## 2018-04-20 RX ORDER — ONDANSETRON 2 MG/ML
INJECTION INTRAMUSCULAR; INTRAVENOUS PRN
Status: DISCONTINUED | OUTPATIENT
Start: 2018-04-20 | End: 2018-04-20 | Stop reason: SDUPTHER

## 2018-04-20 RX ORDER — DIMENHYDRINATE 50 MG/1
50 TABLET ORAL ONCE
Status: COMPLETED | OUTPATIENT
Start: 2018-04-20 | End: 2018-04-20

## 2018-04-20 RX ORDER — SODIUM CHLORIDE 0.9 % (FLUSH) 0.9 %
10 SYRINGE (ML) INJECTION PRN
Status: DISCONTINUED | OUTPATIENT
Start: 2018-04-20 | End: 2018-04-20 | Stop reason: HOSPADM

## 2018-04-20 RX ORDER — KETOROLAC TROMETHAMINE 10 MG/1
10 TABLET, FILM COATED ORAL 4 TIMES DAILY
Qty: 20 TABLET | Refills: 0 | Status: SHIPPED | OUTPATIENT
Start: 2018-04-20 | End: 2018-04-25

## 2018-04-20 RX ORDER — PROPOFOL 10 MG/ML
INJECTION, EMULSION INTRAVENOUS PRN
Status: DISCONTINUED | OUTPATIENT
Start: 2018-04-20 | End: 2018-04-20 | Stop reason: SDUPTHER

## 2018-04-20 RX ORDER — DEXAMETHASONE SODIUM PHOSPHATE 10 MG/ML
INJECTION INTRAMUSCULAR; INTRAVENOUS PRN
Status: DISCONTINUED | OUTPATIENT
Start: 2018-04-20 | End: 2018-04-20 | Stop reason: SDUPTHER

## 2018-04-20 RX ORDER — SODIUM CHLORIDE 0.9 % (FLUSH) 0.9 %
10 SYRINGE (ML) INJECTION EVERY 12 HOURS SCHEDULED
Status: DISCONTINUED | OUTPATIENT
Start: 2018-04-20 | End: 2018-04-20 | Stop reason: HOSPADM

## 2018-04-20 RX ORDER — KETOROLAC TROMETHAMINE 30 MG/ML
30 INJECTION, SOLUTION INTRAMUSCULAR; INTRAVENOUS ONCE
Status: COMPLETED | OUTPATIENT
Start: 2018-04-20 | End: 2018-04-20

## 2018-04-20 RX ADMIN — ONDANSETRON 4 MG: 2 INJECTION INTRAMUSCULAR; INTRAVENOUS at 09:58

## 2018-04-20 RX ADMIN — FENTANYL CITRATE 100 MCG: 50 INJECTION INTRAMUSCULAR; INTRAVENOUS at 09:32

## 2018-04-20 RX ADMIN — SODIUM CHLORIDE, POTASSIUM CHLORIDE, SODIUM LACTATE AND CALCIUM CHLORIDE: 600; 310; 30; 20 INJECTION, SOLUTION INTRAVENOUS at 09:27

## 2018-04-20 RX ADMIN — CEFAZOLIN SODIUM 2 G: 2 SOLUTION INTRAVENOUS at 09:23

## 2018-04-20 RX ADMIN — PROPOFOL 120 MG: 10 INJECTION, EMULSION INTRAVENOUS at 09:32

## 2018-04-20 RX ADMIN — LIDOCAINE HYDROCHLORIDE 4 MG: 20 INJECTION, SOLUTION EPIDURAL; INFILTRATION; INTRACAUDAL; PERINEURAL at 09:32

## 2018-04-20 RX ADMIN — KETOROLAC TROMETHAMINE 30 MG: 30 INJECTION, SOLUTION INTRAMUSCULAR at 11:52

## 2018-04-20 RX ADMIN — DEXAMETHASONE SODIUM PHOSPHATE 8 MG: 4 INJECTION, SOLUTION INTRAMUSCULAR; INTRAVENOUS at 09:58

## 2018-04-20 RX ADMIN — DEXAMETHASONE SODIUM PHOSPHATE 10 MG: 10 INJECTION INTRAMUSCULAR; INTRAVENOUS at 08:40

## 2018-04-20 RX ADMIN — ROPIVACAINE HYDROCHLORIDE 20 ML: 5 INJECTION, SOLUTION EPIDURAL; INFILTRATION; PERINEURAL at 08:40

## 2018-04-20 RX ADMIN — MIDAZOLAM HYDROCHLORIDE 2 MG: 1 INJECTION, SOLUTION INTRAMUSCULAR; INTRAVENOUS at 09:28

## 2018-04-20 RX ADMIN — SODIUM CHLORIDE, POTASSIUM CHLORIDE, SODIUM LACTATE AND CALCIUM CHLORIDE: 600; 310; 30; 20 INJECTION, SOLUTION INTRAVENOUS at 08:05

## 2018-04-20 RX ADMIN — DIMENHYDRINATE 50 MG: 50 TABLET ORAL at 08:03

## 2018-04-20 ASSESSMENT — PULMONARY FUNCTION TESTS
PIF_VALUE: 17
PIF_VALUE: 1
PIF_VALUE: 16
PIF_VALUE: 17
PIF_VALUE: 8
PIF_VALUE: 17
PIF_VALUE: 18
PIF_VALUE: 17
PIF_VALUE: 16
PIF_VALUE: 19
PIF_VALUE: 18
PIF_VALUE: 0
PIF_VALUE: 17
PIF_VALUE: 0
PIF_VALUE: 17
PIF_VALUE: 18
PIF_VALUE: 17
PIF_VALUE: 17
PIF_VALUE: 16
PIF_VALUE: 16
PIF_VALUE: 17
PIF_VALUE: 16
PIF_VALUE: 28
PIF_VALUE: 11
PIF_VALUE: 17
PIF_VALUE: 16
PIF_VALUE: 16
PIF_VALUE: 18
PIF_VALUE: 18
PIF_VALUE: 1
PIF_VALUE: 8
PIF_VALUE: 15
PIF_VALUE: 16
PIF_VALUE: 4
PIF_VALUE: 1
PIF_VALUE: 16
PIF_VALUE: 17
PIF_VALUE: 17
PIF_VALUE: 6
PIF_VALUE: 19
PIF_VALUE: 17
PIF_VALUE: 19
PIF_VALUE: 17
PIF_VALUE: 18
PIF_VALUE: 14
PIF_VALUE: 17
PIF_VALUE: 1
PIF_VALUE: 18
PIF_VALUE: 17
PIF_VALUE: 1
PIF_VALUE: 18
PIF_VALUE: 17
PIF_VALUE: 9
PIF_VALUE: 15
PIF_VALUE: 18
PIF_VALUE: 17
PIF_VALUE: 16
PIF_VALUE: 18
PIF_VALUE: 5
PIF_VALUE: 17
PIF_VALUE: 17

## 2018-04-20 ASSESSMENT — PAIN - FUNCTIONAL ASSESSMENT: PAIN_FUNCTIONAL_ASSESSMENT: 0-10

## 2018-04-20 ASSESSMENT — PAIN SCALES - GENERAL
PAINLEVEL_OUTOF10: 0

## 2018-05-22 ENCOUNTER — HOSPITAL ENCOUNTER (OUTPATIENT)
Dept: PHYSICAL THERAPY | Age: 28
Setting detail: THERAPIES SERIES
Discharge: HOME | End: 2018-05-22
Payer: COMMERCIAL

## 2018-05-22 PROCEDURE — 97161 PT EVAL LOW COMPLEX 20 MIN: CPT

## 2018-05-22 PROCEDURE — G0283 ELEC STIM OTHER THAN WOUND: HCPCS

## 2018-05-22 PROCEDURE — G8985 CARRY GOAL STATUS: HCPCS

## 2018-05-22 PROCEDURE — 97140 MANUAL THERAPY 1/> REGIONS: CPT

## 2018-05-22 PROCEDURE — 97110 THERAPEUTIC EXERCISES: CPT

## 2018-05-22 PROCEDURE — G8984 CARRY CURRENT STATUS: HCPCS

## 2018-05-24 ENCOUNTER — HOSPITAL ENCOUNTER (OUTPATIENT)
Dept: PHYSICAL THERAPY | Age: 28
Setting detail: THERAPIES SERIES
Discharge: HOME | End: 2018-05-24
Payer: COMMERCIAL

## 2018-05-24 PROCEDURE — G0283 ELEC STIM OTHER THAN WOUND: HCPCS

## 2018-05-24 PROCEDURE — 97110 THERAPEUTIC EXERCISES: CPT

## 2018-05-24 PROCEDURE — 97140 MANUAL THERAPY 1/> REGIONS: CPT

## 2018-05-25 ENCOUNTER — HOSPITAL ENCOUNTER (OUTPATIENT)
Dept: PHYSICAL THERAPY | Age: 28
Setting detail: THERAPIES SERIES
Discharge: HOME | End: 2018-05-25
Payer: COMMERCIAL

## 2018-05-25 PROCEDURE — 97140 MANUAL THERAPY 1/> REGIONS: CPT

## 2018-05-25 PROCEDURE — 97110 THERAPEUTIC EXERCISES: CPT

## 2018-05-25 PROCEDURE — G0283 ELEC STIM OTHER THAN WOUND: HCPCS

## 2018-05-29 ENCOUNTER — HOSPITAL ENCOUNTER (OUTPATIENT)
Dept: PHYSICAL THERAPY | Age: 28
Setting detail: THERAPIES SERIES
Discharge: HOME | End: 2018-05-29
Payer: COMMERCIAL

## 2018-05-29 PROCEDURE — 97140 MANUAL THERAPY 1/> REGIONS: CPT

## 2018-05-29 PROCEDURE — 97110 THERAPEUTIC EXERCISES: CPT

## 2018-05-29 PROCEDURE — G0283 ELEC STIM OTHER THAN WOUND: HCPCS

## 2018-05-31 ENCOUNTER — HOSPITAL ENCOUNTER (OUTPATIENT)
Dept: PHYSICAL THERAPY | Age: 28
Setting detail: THERAPIES SERIES
Discharge: HOME | End: 2018-05-31
Payer: COMMERCIAL

## 2018-05-31 PROCEDURE — G0283 ELEC STIM OTHER THAN WOUND: HCPCS

## 2018-05-31 PROCEDURE — 97140 MANUAL THERAPY 1/> REGIONS: CPT

## 2018-05-31 PROCEDURE — 97110 THERAPEUTIC EXERCISES: CPT

## 2018-06-01 ENCOUNTER — HOSPITAL ENCOUNTER (OUTPATIENT)
Dept: PHYSICAL THERAPY | Age: 28
Setting detail: THERAPIES SERIES
Discharge: HOME | End: 2018-06-01
Payer: COMMERCIAL

## 2018-06-01 PROCEDURE — G0283 ELEC STIM OTHER THAN WOUND: HCPCS

## 2018-06-01 PROCEDURE — 97110 THERAPEUTIC EXERCISES: CPT

## 2018-06-04 ENCOUNTER — APPOINTMENT (OUTPATIENT)
Dept: PHYSICAL THERAPY | Age: 28
End: 2018-06-04
Payer: COMMERCIAL

## 2018-06-05 ENCOUNTER — HOSPITAL ENCOUNTER (OUTPATIENT)
Dept: PHYSICAL THERAPY | Age: 28
Setting detail: THERAPIES SERIES
Discharge: HOME | End: 2018-06-05
Payer: COMMERCIAL

## 2018-06-05 PROCEDURE — 97110 THERAPEUTIC EXERCISES: CPT

## 2018-06-05 PROCEDURE — 97140 MANUAL THERAPY 1/> REGIONS: CPT

## 2018-06-05 PROCEDURE — G0283 ELEC STIM OTHER THAN WOUND: HCPCS

## 2018-06-07 ENCOUNTER — HOSPITAL ENCOUNTER (OUTPATIENT)
Dept: PHYSICAL THERAPY | Age: 28
Setting detail: THERAPIES SERIES
Discharge: HOME | End: 2018-06-07
Payer: COMMERCIAL

## 2018-06-07 PROCEDURE — 97140 MANUAL THERAPY 1/> REGIONS: CPT

## 2018-06-07 PROCEDURE — G0283 ELEC STIM OTHER THAN WOUND: HCPCS

## 2018-06-07 PROCEDURE — 97110 THERAPEUTIC EXERCISES: CPT

## 2018-06-07 ASSESSMENT — PAIN SCALES - GENERAL: PAINLEVEL_OUTOF10: 2

## 2018-06-08 ENCOUNTER — HOSPITAL ENCOUNTER (OUTPATIENT)
Dept: PHYSICAL THERAPY | Age: 28
Setting detail: THERAPIES SERIES
Discharge: HOME | End: 2018-06-08
Payer: COMMERCIAL

## 2018-06-08 PROCEDURE — G8985 CARRY GOAL STATUS: HCPCS

## 2018-06-08 PROCEDURE — G0283 ELEC STIM OTHER THAN WOUND: HCPCS

## 2018-06-08 PROCEDURE — G8984 CARRY CURRENT STATUS: HCPCS

## 2018-06-08 PROCEDURE — 97140 MANUAL THERAPY 1/> REGIONS: CPT

## 2018-06-08 PROCEDURE — 97110 THERAPEUTIC EXERCISES: CPT

## 2018-06-11 ENCOUNTER — HOSPITAL ENCOUNTER (OUTPATIENT)
Dept: PHYSICAL THERAPY | Age: 28
Setting detail: THERAPIES SERIES
Discharge: HOME | End: 2018-06-11
Payer: COMMERCIAL

## 2018-06-11 PROCEDURE — G0283 ELEC STIM OTHER THAN WOUND: HCPCS

## 2018-06-11 PROCEDURE — 97110 THERAPEUTIC EXERCISES: CPT

## 2018-06-11 PROCEDURE — 97140 MANUAL THERAPY 1/> REGIONS: CPT

## 2018-06-12 ENCOUNTER — HOSPITAL ENCOUNTER (OUTPATIENT)
Dept: PHYSICAL THERAPY | Age: 28
Setting detail: THERAPIES SERIES
Discharge: HOME | End: 2018-06-12
Payer: COMMERCIAL

## 2018-06-12 PROCEDURE — G0283 ELEC STIM OTHER THAN WOUND: HCPCS

## 2018-06-12 PROCEDURE — 97110 THERAPEUTIC EXERCISES: CPT

## 2018-06-15 ENCOUNTER — HOSPITAL ENCOUNTER (OUTPATIENT)
Dept: PHYSICAL THERAPY | Age: 28
Setting detail: THERAPIES SERIES
Discharge: HOME | End: 2018-06-15
Payer: COMMERCIAL

## 2018-06-15 PROCEDURE — G0283 ELEC STIM OTHER THAN WOUND: HCPCS

## 2018-06-15 PROCEDURE — 97110 THERAPEUTIC EXERCISES: CPT

## 2018-06-18 ENCOUNTER — HOSPITAL ENCOUNTER (OUTPATIENT)
Dept: PHYSICAL THERAPY | Age: 28
Setting detail: THERAPIES SERIES
Discharge: HOME | End: 2018-06-18
Payer: COMMERCIAL

## 2018-06-18 PROCEDURE — G0283 ELEC STIM OTHER THAN WOUND: HCPCS

## 2018-06-18 PROCEDURE — 97110 THERAPEUTIC EXERCISES: CPT

## 2018-06-18 PROCEDURE — 97140 MANUAL THERAPY 1/> REGIONS: CPT

## 2018-06-20 ENCOUNTER — HOSPITAL ENCOUNTER (OUTPATIENT)
Dept: PHYSICAL THERAPY | Age: 28
Setting detail: THERAPIES SERIES
Discharge: HOME | End: 2018-06-20
Payer: COMMERCIAL

## 2018-06-20 PROCEDURE — G0283 ELEC STIM OTHER THAN WOUND: HCPCS

## 2018-06-20 PROCEDURE — 97110 THERAPEUTIC EXERCISES: CPT

## 2018-07-05 ENCOUNTER — APPOINTMENT (OUTPATIENT)
Dept: PHYSICAL THERAPY | Age: 28
End: 2018-07-05
Payer: COMMERCIAL

## 2018-07-06 ENCOUNTER — HOSPITAL ENCOUNTER (OUTPATIENT)
Dept: PHYSICAL THERAPY | Age: 28
Setting detail: THERAPIES SERIES
Discharge: HOME | End: 2018-07-06
Payer: COMMERCIAL

## 2018-07-06 PROCEDURE — 97140 MANUAL THERAPY 1/> REGIONS: CPT

## 2018-07-06 PROCEDURE — 97110 THERAPEUTIC EXERCISES: CPT

## 2018-07-06 PROCEDURE — G0283 ELEC STIM OTHER THAN WOUND: HCPCS

## 2018-07-09 ENCOUNTER — HOSPITAL ENCOUNTER (OUTPATIENT)
Dept: PHYSICAL THERAPY | Age: 28
Setting detail: THERAPIES SERIES
Discharge: HOME | End: 2018-07-09
Payer: COMMERCIAL

## 2018-07-09 PROCEDURE — 97110 THERAPEUTIC EXERCISES: CPT

## 2018-07-09 PROCEDURE — G0283 ELEC STIM OTHER THAN WOUND: HCPCS

## 2018-07-10 ENCOUNTER — HOSPITAL ENCOUNTER (OUTPATIENT)
Dept: PHYSICAL THERAPY | Age: 28
Setting detail: THERAPIES SERIES
Discharge: HOME | End: 2018-07-10
Payer: COMMERCIAL

## 2018-07-10 PROCEDURE — 97110 THERAPEUTIC EXERCISES: CPT

## 2018-07-10 PROCEDURE — G0283 ELEC STIM OTHER THAN WOUND: HCPCS

## 2018-07-10 ASSESSMENT — PAIN SCALES - GENERAL: PAINLEVEL_OUTOF10: 2

## 2018-07-10 NOTE — PROGRESS NOTES
1: Pt to be instructed in home program. --met                                        []Met   []Partially met  []Not met   Short term goal 2: Pt to achieve 120 degrees of right shoulder flexion passively to assist with ADL's. --met  []Met   []Partially met  []Not met   Short term goal 3: Pt to begin AROM right shoulder to progress strength and ROM. --met  []Met   []Partially met  []Not met      []Met   []Partially met  []Not met     Long Term Goals - Time Frame for Long term goals : 6 weeks   Long term goal 1: Pt to report independence and compliance with home program.  []Met  []Partially met  []Not met   Long term goal 2: Pt to achieve 150 degrees of right shoulder elevation actively to assist with overhead activity. []Met  []Partially met  []Not met   Long term goal 3: Pt to demonstrate 4+ to 5/5 strength of right shoulder to assist with ADL's. []Met  []Partially met  []Not met   Long term goal 4: Pt to have actively IR right shoulder to L2 region to assist with dressing. []Met  []Partially met  []Not met   Long term goal 5: Pt to report pain no greater than 2/10 in right shoulder during ADL's.   []Met  []Partially met  []Not met       Minutes Tracking:  Time In: 1045  Time Out: [address]  Minutes: [address] [facility] Date: 7/09/2018

## 2018-07-10 NOTE — PROGRESS NOTES
Phone: Yara           Fax: [phone]                           Outpatient Physical Therapy                                                                            Daily Note    Patient: Amelia Salamanca : 1990  CSN #: [id number]   Referring Practitioner:  Tavo Marie MD    Referral Date : 18     Date: 7/10/2018    Diagnosis: Superior glenoid labrum lesion right shoulder, S43.431A  Treatment Diagnosis: right shoulder pain    Onset Date: 18  PT Insurance Information: BCBS  Total # of Visits Approved: 18 Per Physician Order  Total # of Visits to Date: 17  No Show: 0  Canceled Appointment: 0      Pre-Treatment Pain:  2/10  Subjective: Pt states she only has pain when she does certain things, like taking off her shirt and sleeping on couch    Exercises:                       Exercise 8: rows/extension green 15x  Exercise 9: 90/90/scaption 3# 15x   Exercise 10: BodyBlade (small) flex and abd  Exercise 11: i't'y's 20x ea 1# (bench)   Exercise 12: Bosu ant/ post/ Lat in kneeling x25 - plank up/down in kneeling 10x            Exercise 16: IR. ER GTB 15x - [place] this date IR 3.5, ER 2.0        Exercise 18: Ball Stab on Wall 2# x15 4-way  Exercise 19: joystick  cw/ccw fig 8 20x   Exercise 20: Pulldowns 7pl waist, 6pl chest 20x       Manual:     PROM: right shoulder                Modalities:      Cryotherapy (Minutes\Location): 15 mins with IFC       E-stim (parameters): IFC for pain--for post exercise soreness                  Assessment  Assessment: Passive flexion 165-170 flexion 155-160 abduction, ER 80.-- not pusched. Pt scapular strength is improving and note improved stability of shld. Plan to progress as tolerated     Patient Education   HEP  Pt verbalized/demonstrated good understanding:     [x] Yes         [] No, pt required further clarification.     Post Treatment Pain:  2/10      Plan  Times per week: 3  Plan weeks: 6      Goals  (Total # of Visits to Date: 16)   Short Term Goals - Time Frame for Short term goals: 3 weeks     Short term goal 1: Pt to be instructed in home program. --met                                        []Met   []Partially met  []Not met   Short term goal 2: Pt to achieve 120 degrees of right shoulder flexion passively to assist with ADL's. --met  []Met   []Partially met  []Not met   Short term goal 3: Pt to begin AROM right shoulder to progress strength and ROM. --met  []Met   []Partially met  []Not met      []Met   []Partially met  []Not met     Long Term Goals - Time Frame for Long term goals : 6 weeks   Long term goal 1: Pt to report independence and compliance with home program.  []Met  []Partially met  []Not met   Long term goal 2: Pt to achieve 150 degrees of right shoulder elevation actively to assist with overhead activity. []Met  []Partially met  []Not met   Long term goal 3: Pt to demonstrate 4+ to 5/5 strength of right shoulder to assist with ADL's. []Met  []Partially met  []Not met   Long term goal 4: Pt to have actively IR right shoulder to L2 region to assist with dressing. []Met  []Partially met  []Not met   Long term goal 5: Pt to report pain no greater than 2/10 in right shoulder during ADL's.   []Met  []Partially met  []Not met       Minutes Tracking:  Time In: 1130  Time Out: [address]  Minutes: [address] [facility] Date: 7/10/2018

## 2018-07-10 NOTE — PROGRESS NOTES
verbalized/demonstrated good understanding:     [] Yes         [] No, pt required further clarification. Post Treatment Pain:  2/10      Plan  Times per week: 3  Plan weeks: 6      Goals  (Total # of Visits to Date: 16)   Short Term Goals - Time Frame for Short term goals: 3 weeks     Short term goal 1: Pt to be instructed in home program. --met                                        []Met   []Partially met  []Not met   Short term goal 2: Pt to achieve 120 degrees of right shoulder flexion passively to assist with ADL's. --met  []Met   []Partially met  []Not met   Short term goal 3: Pt to begin AROM right shoulder to progress strength and ROM. --met  []Met   []Partially met  []Not met      []Met   []Partially met  []Not met     Long Term Goals - Time Frame for Long term goals : 6 weeks   Long term goal 1: Pt to report independence and compliance with home program.  []Met  []Partially met  []Not met   Long term goal 2: Pt to achieve 150 degrees of right shoulder elevation actively to assist with overhead activity. []Met  []Partially met  []Not met   Long term goal 3: Pt to demonstrate 4+ to 5/5 strength of right shoulder to assist with ADL's. []Met  []Partially met  []Not met   Long term goal 4: Pt to have actively IR right shoulder to L2 region to assist with dressing. []Met  []Partially met  []Not met   Long term goal 5: Pt to report pain no greater than 2/10 in right shoulder during ADL's.   []Met  []Partially met  []Not met       Minutes Tracking:  Time In: 1045  Time Out: [address] [facility]  Minutes: 63    Jose Baca Date: 7/09/2018

## 2018-07-13 ENCOUNTER — HOSPITAL ENCOUNTER (OUTPATIENT)
Dept: PHYSICAL THERAPY | Age: 28
Setting detail: THERAPIES SERIES
Discharge: HOME | End: 2018-07-13
Payer: COMMERCIAL

## 2018-07-13 PROCEDURE — 97110 THERAPEUTIC EXERCISES: CPT

## 2018-07-13 PROCEDURE — G0283 ELEC STIM OTHER THAN WOUND: HCPCS

## 2018-07-13 NOTE — PROGRESS NOTES
0/10      Plan  Times per week: 3  Plan weeks: 6      Goals  (Total # of Visits to Date: 25)   Short Term Goals - Time Frame for Short term goals: 3 weeks     Short term goal 1: Pt to be instructed in home program. --met                                        [x]Met   []Partially met  []Not met   Short term goal 2: Pt to achieve 120 degrees of right shoulder flexion passively to assist with ADL's. --met  [x]Met   []Partially met  []Not met   Short term goal 3: Pt to begin AROM right shoulder to progress strength and ROM. --met  [x]Met   []Partially met  []Not met      []Met   []Partially met  []Not met     Long Term Goals - Time Frame for Long term goals : 6 weeks   Long term goal 1: Pt to report independence and compliance with home program.  []Met  []Partially met  [x]Not met   Long term goal 2: Pt to achieve 150 degrees of right shoulder elevation actively to assist with overhead activity. []Met  []Partially met  [x]Not met   Long term goal 3: Pt to demonstrate 4+ to 5/5 strength of right shoulder to assist with ADL's. []Met  []Partially met  [x]Not met   Long term goal 4: Pt to have actively IR right shoulder to L2 region to assist with dressing. []Met  []Partially met  [x]Not met   Long term goal 5: Pt to report pain no greater than 2/10 in right shoulder during ADL's.   []Met  []Partially met  [x]Not met       Minutes Tracking:  Time In: 1101  Time Out: [address]  Minutes: [address] [place], [place]     Date: 7/13/2018

## 2018-07-17 ENCOUNTER — HOSPITAL ENCOUNTER (OUTPATIENT)
Dept: PHYSICAL THERAPY | Age: 28
Setting detail: THERAPIES SERIES
Discharge: HOME | End: 2018-07-17
Payer: COMMERCIAL

## 2018-07-17 PROCEDURE — 97110 THERAPEUTIC EXERCISES: CPT

## 2018-07-17 PROCEDURE — G0283 ELEC STIM OTHER THAN WOUND: HCPCS

## 2018-07-19 ENCOUNTER — HOSPITAL ENCOUNTER (OUTPATIENT)
Dept: PHYSICAL THERAPY | Age: 28
Setting detail: THERAPIES SERIES
Discharge: HOME | End: 2018-07-19
Payer: COMMERCIAL

## 2018-07-19 PROCEDURE — G0283 ELEC STIM OTHER THAN WOUND: HCPCS

## 2018-07-19 PROCEDURE — G8984 CARRY CURRENT STATUS: HCPCS

## 2018-07-19 PROCEDURE — G8985 CARRY GOAL STATUS: HCPCS

## 2018-07-19 PROCEDURE — 97110 THERAPEUTIC EXERCISES: CPT

## 2018-07-19 ASSESSMENT — PAIN SCALES - GENERAL: PAINLEVEL_OUTOF10: 2

## 2018-07-23 ENCOUNTER — HOSPITAL ENCOUNTER (OUTPATIENT)
Dept: PHYSICAL THERAPY | Age: 28
Setting detail: THERAPIES SERIES
Discharge: HOME | End: 2018-07-23
Payer: COMMERCIAL

## 2018-07-23 PROCEDURE — G0283 ELEC STIM OTHER THAN WOUND: HCPCS

## 2018-07-23 PROCEDURE — 97140 MANUAL THERAPY 1/> REGIONS: CPT

## 2018-07-23 PROCEDURE — 97110 THERAPEUTIC EXERCISES: CPT

## 2018-07-24 NOTE — PROGRESS NOTES
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 Mild/mod soreness UT/ levator scap area     Exercises:  Exercise 1: UBE: 4 min fwd/retro        Exercise 4: shrugs /retraction --7# 3 way curls 15x each           Exercise 8: rows/extension  15x--blk  Exercise 9: 90/90/scaption 5# 15x   Exercise 10: BodyBlade (large) flex and abd  Exercise 11: i't'y's 20x ea 3# (bench)   Exercise 12: 5# wall taps 15x paired with stairmaster push ups 2x15 ea  Exercise 13: [place] carry, 5# (2) 3x50'  Exercise 14: Supine 90 degree rhythmic stab 3x30 seconds  Exercise 15: All 4's WBing exercise   Exercise 16: IR. ER GTB 15x - [place] this date IR 3.5, ER 2.0           Exercise 19: joystick  cw/ccw fig 8 20x   Exercise 20: Pulldowns 7pl waist, 6pl chest 20x       Manual:     PROM: right shoulder           Soft Tissue Mobalization: levator scap region     Modalities:      Cryotherapy (Minutes\Location): 15 mins with IFC       E-stim (parameters): IFC for pain--for post exercise soreness                  Assessment  Assessment: Moderate levator scap tightness. Progresed some gym wts with fair tolerance. Flexion strength is 4-/5. Started WB exercises on all 4s.  Will cont to progress as

## 2018-07-26 ENCOUNTER — HOSPITAL ENCOUNTER (OUTPATIENT)
Dept: PHYSICAL THERAPY | Age: 28
Setting detail: THERAPIES SERIES
Discharge: HOME | End: 2018-07-26
Payer: COMMERCIAL

## 2018-07-26 PROCEDURE — G0283 ELEC STIM OTHER THAN WOUND: HCPCS

## 2018-07-26 PROCEDURE — 97110 THERAPEUTIC EXERCISES: CPT

## 2018-07-26 NOTE — PROGRESS NOTES
Phone: Yara           Fax: [phone]                           Outpatient Physical Therapy                                                                            Daily Note    Patient: Shellie Laureano : 1990  CSN #: [id number]   Referring Practitioner:  Mira Bravo MD    Referral Date : 18     Date: 2018    Diagnosis: Superior glenoid labrum lesion right shoulder, S43.431A  Treatment Diagnosis: right shoulder pain    Onset Date: 18  PT Insurance Information: BCBS-90 DAYS PER YEAR COMBINED PT, OT &ST COVERED % AFTER 250.00 DED     Per Physician Order  Total # of Visits to Date: 22  No Show: 0  Canceled Appointment: 0      Pre-Treatment Pain:  0/10  Subjective: Pt reports her R shld overall is just sore but no real pain. Exercises:  Exercise 1: UBE: 4 min fwd/retro     Exercise 3: brown ball roll on wall 3 sets  Exercise 4: shrugs /retraction --7# 3 way curls 15x each      Exercise 9: 90/90/scaption 5# 15x   Exercise 10: BodyBlade (large) flex and abd  Exercise 11: i't'y's 20x ea 3# (bench)   Exercise 12: 5# wall taps 15x paired with stairmaster push ups 2x15 ea      Exercise 16: IR. ER GTB 15x - [place] this date IR 3.5, ER 2.0     Exercise 17: TB ABCs Red x2  Exercise 18: Ball Stab on Wall 2# x15 4-way     Exercise 20: Pulldowns 7pl waist, 6pl chest 20x      Modalities:      Cryotherapy (Minutes\Location): 15 mins with IFC       E-stim (parameters): IFC for pain--for post exercise soreness         Assessment  Assessment: Continued to work on endurance based ex R shld to help increase functional strength needed for work/ sport duties. Pt with end range strength limitations. Patient Education  Cont current HEP. Pt verbalized/demonstrated good understanding:     [x] Yes         [] No, pt required further clarification.     Post Treatment Pain:  0/10      Plan  Times per week: 3  Plan weeks: 6      Goals  (Total # of Visits to Date: 25)   Short Term Goals - Time Frame for Short term goals: 3 weeks     Short term goal 1: Pt to be instructed in home program. --met                                        [x]Met   []Partially met  []Not met   Short term goal 2: Pt to achieve 120 degrees of right shoulder flexion passively to assist with ADL's. --met  [x]Met   []Partially met  []Not met   Short term goal 3: Pt to begin AROM right shoulder to progress strength and ROM. --met  [x]Met   []Partially met  []Not met      []Met  []Partially met  []Not met     Long Term Goals - Time Frame for Long term goals : 6 weeks   Long term goal 1: Pt to report independence and compliance with home program.  []Met  []Partially met  [x]Not met   Long term goal 2: Pt to achieve 150 degrees of right shoulder elevation actively to assist with overhead activity. []Met  []Partially met  [x]Not met   Long term goal 3: Pt to demonstrate 4+ to 5/5 strength of right shoulder to assist with ADL's. []Met  []Partially met  [x]Not met   Long term goal 4: Pt to have actively IR right shoulder to L2 region to assist with dressing. []Met  []Partially met  [x]Not met   Long term goal 5: Pt to report pain no greater than 2/10 in right shoulder during ADL's.   []Met  []Partially met  [x]Not met       Minutes Tracking:  Time In: 1100  Time Out: [address]  Minutes: [address] [place], [place]    Date: 7/26/2018

## 2018-07-27 ENCOUNTER — HOSPITAL ENCOUNTER (OUTPATIENT)
Dept: PHYSICAL THERAPY | Age: 28
Setting detail: THERAPIES SERIES
Discharge: HOME | End: 2018-07-27
Payer: COMMERCIAL

## 2018-07-27 PROCEDURE — G0283 ELEC STIM OTHER THAN WOUND: HCPCS

## 2018-07-27 PROCEDURE — 97110 THERAPEUTIC EXERCISES: CPT

## 2018-07-30 ENCOUNTER — HOSPITAL ENCOUNTER (OUTPATIENT)
Dept: PHYSICAL THERAPY | Age: 28
Setting detail: THERAPIES SERIES
Discharge: HOME | End: 2018-07-30
Payer: COMMERCIAL

## 2018-07-30 PROCEDURE — G8985 CARRY GOAL STATUS: HCPCS

## 2018-07-30 PROCEDURE — 97140 MANUAL THERAPY 1/> REGIONS: CPT

## 2018-07-30 PROCEDURE — G8984 CARRY CURRENT STATUS: HCPCS

## 2018-07-30 PROCEDURE — 97110 THERAPEUTIC EXERCISES: CPT

## 2018-07-30 PROCEDURE — G0283 ELEC STIM OTHER THAN WOUND: HCPCS

## 2018-07-30 NOTE — PROGRESS NOTES
Phone: Yara           Fax: [phone]                           Outpatient Physical Therapy                                                                            Daily Note    Patient: Cydney Ruff : 1990  CSN #: [id number]   Referring Practitioner:  Karolyn Clayton MD    Referral Date : 18     Date: 2018    Diagnosis: Superior glenoid labrum lesion right shoulder, S43.431A  Treatment Diagnosis: right shoulder pain    Onset Date: 18  PT Insurance Information: BCBS-90 DAYS PER YEAR COMBINED PT, OT &ST COVERED % AFTER 250.00 DED   Total # of Visits Approved: 18 Per Physician Order  Total # of Visits to Date:   No Show: 0  Canceled Appointment: 0      Pre-Treatment Pain:  2/10  Subjective: Reports pain 2/10. Some soreness anterior shld     Exercises:  Exercise 1: UBE: 4 min fwd/retro        Exercise 4: shrugs /retraction --7# 3 way curls 15x each           Exercise 8: rows/extension  15x--blk  Exercise 9: 90/90/scaption # 15x   Exercise 10: BodyBlade (large) flex and abd  Exercise 11: i't'y's 20x ea 3# (bench)   Exercise 12: 7# wall taps 15x paired with stairmaster push ups 2x15 ea        Exercise 15: All 4's WBing exercise   Exercise 16: IR. ER GTB 15x - [place] this date IR 3.5, ER 2.0     Exercise 17: TB ABCs Red x2     Exercise 19: joystick  cw/ccw fig 8 20x   Exercise 20: Pulldowns 7pl waist, 6pl chest 20x       Manual:     PROM: right shoulder           Soft Tissue Mobalization: levator scap region     Modalities:      Cryotherapy (Minutes\Location): 15 mins with IFC       E-stim (parameters): IFC for pain--for post exercise soreness                  Assessment  Assessment: Passive flexion 170*, ER 80. Mild anterior shld pain with certain overhead activities. Strength is 4-/4/5. IFC/cp post exercise due to post exercise soreness.      Patient Education  HEP  Pt verbalized/demonstrated good understanding:     [x] Yes         []

## 2018-08-02 ENCOUNTER — HOSPITAL ENCOUNTER (OUTPATIENT)
Dept: PHYSICAL THERAPY | Age: 28
Setting detail: THERAPIES SERIES
Discharge: HOME | End: 2018-08-02
Payer: COMMERCIAL

## 2018-08-02 PROCEDURE — 97110 THERAPEUTIC EXERCISES: CPT

## 2018-08-02 PROCEDURE — G0283 ELEC STIM OTHER THAN WOUND: HCPCS

## 2018-08-02 NOTE — PROGRESS NOTES
[]Met   []Partially met  []Not met   Short term goal 2: Pt to achieve 120 degrees of right shoulder flexion passively to assist with ADL's. --met  []Met   []Partially met  []Not met   Short term goal 3: Pt to begin AROM right shoulder to progress strength and ROM. --met  []Met   []Partially met  []Not met      []Met   []Partially met  []Not met     Long Term Goals - Time Frame for Long term goals : 6 weeks   Long term goal 1: Pt to report independence and compliance with home program.  []Met  []Partially met  []Not met   Long term goal 2: Pt to achieve 150 degrees of right shoulder elevation actively to assist with overhead activity. []Met  []Partially met  []Not met   Long term goal 3: Pt to demonstrate 4+ to 5/5 strength of right shoulder to assist with ADL's. []Met  []Partially met  []Not met   Long term goal 4: Pt to have actively IR right shoulder to L2 region to assist with dressing. -MET []Met  []Partially met  []Not met   Long term goal 5: Pt to report pain no greater than 2/10 in right shoulder during ADL's.   []Met  []Partially met  []Not met       Minutes Tracking:  Time In: 1128  Time Out: [id number]  Minutes: Juan Cifuentes 49 PTA Date: 8/2/2018

## 2018-08-03 ENCOUNTER — HOSPITAL ENCOUNTER (OUTPATIENT)
Dept: PHYSICAL THERAPY | Age: 28
Setting detail: THERAPIES SERIES
Discharge: HOME | End: 2018-08-03
Payer: COMMERCIAL

## 2018-08-03 PROCEDURE — G8986 CARRY D/C STATUS: HCPCS

## 2018-08-03 PROCEDURE — G8985 CARRY GOAL STATUS: HCPCS

## 2018-08-03 PROCEDURE — 97110 THERAPEUTIC EXERCISES: CPT

## 2018-08-03 PROCEDURE — G0283 ELEC STIM OTHER THAN WOUND: HCPCS

## 2018-08-03 NOTE — PROGRESS NOTES
Phone: Yara           Fax: [phone]                           Outpatient Physical Therapy                                                                            Daily Note    Patient: Mann Noel : 1990  CSN #: [id number]   Referring Practitioner:  Agustina De La Garza MD    Referral Date : 18     Date: 8/3/2018    Diagnosis: Superior glenoid labrum lesion right shoulder, S43.431A  Treatment Diagnosis: right shoulder pain    Onset Date: 18  PT Insurance Information: BCBS-90 DAYS PER YEAR COMBINED PT, OT &ST COVERED % AFTER 250.00 DED   Total # of Visits Approved: 35 Per Physician Order  Total # of Visits to Date: 32  No Show: 0  Canceled Appointment: 0      Pre-Treatment Pain:  0/10  Subjective: Will not see Dr again until the end of Sept. States only gets soreness in the morning she wakes up, lasts only a few minutes. Exercises:  Exercise 2: pulleys - 5mins - D/C   Exercise 5: [place] d1 ext / d2 flex 6/.5/ 2. Exercise 8: rows/extension  15x--blk - [place] this date x15  Exercise 10: BodyBlade (large) flex and abd  Exercise 11: i't'y's 10x ea 4 and 5# (bench)    Exercise 14: swiss ball walk outs 10x   Exercise 15: push up with swiss ball - 2x 10   Exercise 19: joystick  cw/ccw fig 8 20x   Exercise 20: Pulldowns 6pl waist, 7pl chest 20x       Manual:  PROM: right shoulder           Modalities:  Cryotherapy (Minutes\Location): 15 mins with IFC   E-stim (parameters): IFC for pain--for post exercise soreness              Assessment  Assessment: Pt completed 26 PT visits following right shoulder surgery. AROM of right shoulder is full. Strength with MMT to right shoulder is grossly 4+ to 5/5. Pt is without complaints of pain with strengthening exercises and functional tasks.  We will now discharge to home program.     Patient Education  DC to HEP  Pt verbalized/demonstrated good understanding:     [x] Yes         [] No, pt required further clarification. Post Treatment Pain:  0/10      Plan  Times per week: 3  Plan weeks: 8      Goals  (Total # of Visits to Date: 32)   Short Term Goals - Time Frame for Short term goals: 3 weeks     Short term goal 1: Pt to be instructed in home program. --met                                        []Met   []Partially met  []Not met   Short term goal 2: Pt to achieve 120 degrees of right shoulder flexion passively to assist with ADL's. --met  []Met   []Partially met  []Not met   Short term goal 3: Pt to begin AROM right shoulder to progress strength and ROM. --met  []Met   []Partially met  []Not met      []Met   []Partially met  []Not met     Long Term Goals - Time Frame for Long term goals : 6 weeks   Long term goal 1: Pt to report independence and compliance with home program. - met []Met  []Partially met  []Not met   Long term goal 2: Pt to achieve 150 degrees of right shoulder elevation actively to assist with overhead activity. - met []Met  []Partially met  []Not met   Long term goal 3: Pt to demonstrate 4+ to 5/5 strength of right shoulder to assist with ADL's. - met []Met  []Partially met  []Not met   Long term goal 4: Pt to have actively IR right shoulder to L2 region to assist with dressing.  -MET []Met  []Partially met  []Not met   Long term goal 5: Pt to report pain no greater than 2/10 in right shoulder during ADL's. - met []Met  []Partially met  []Not met       Minutes Tracking:  Time In: 1316  Time Out: [address]  Minutes: [address] , PT, DPT, CMPT  Date: 8/3/2018

## 2018-08-06 ENCOUNTER — APPOINTMENT (OUTPATIENT)
Dept: PHYSICAL THERAPY | Age: 28
End: 2018-08-06
Payer: COMMERCIAL

## 2018-08-06 ASSESSMENT — PAIN SCALES - GENERAL: PAINLEVEL_OUTOF10: 2

## 2018-08-08 ENCOUNTER — APPOINTMENT (OUTPATIENT)
Dept: PHYSICAL THERAPY | Age: 28
End: 2018-08-08
Payer: COMMERCIAL

## 2018-08-10 ENCOUNTER — APPOINTMENT (OUTPATIENT)
Dept: PHYSICAL THERAPY | Age: 28
End: 2018-08-10
Payer: COMMERCIAL

## 2018-08-13 ENCOUNTER — APPOINTMENT (OUTPATIENT)
Dept: PHYSICAL THERAPY | Age: 28
End: 2018-08-13
Payer: COMMERCIAL

## 2018-08-15 ENCOUNTER — APPOINTMENT (OUTPATIENT)
Dept: PHYSICAL THERAPY | Age: 28
End: 2018-08-15
Payer: COMMERCIAL

## 2018-08-16 PROCEDURE — G8985 CARRY GOAL STATUS: HCPCS

## 2018-08-16 PROCEDURE — G8986 CARRY D/C STATUS: HCPCS

## 2018-08-17 ENCOUNTER — APPOINTMENT (OUTPATIENT)
Dept: PHYSICAL THERAPY | Age: 28
End: 2018-08-17
Payer: COMMERCIAL

## 2020-02-19 ENCOUNTER — HOSPITAL ENCOUNTER (EMERGENCY)
Dept: HOSPITAL 25 - UCEAST | Age: 30
Discharge: HOME | End: 2020-02-19
Payer: COMMERCIAL

## 2020-02-19 VITALS — SYSTOLIC BLOOD PRESSURE: 107 MMHG | DIASTOLIC BLOOD PRESSURE: 67 MMHG

## 2020-02-19 DIAGNOSIS — M77.8: Primary | ICD-10-CM

## 2020-02-19 DIAGNOSIS — Z88.5: ICD-10-CM

## 2020-02-19 PROCEDURE — G0463 HOSPITAL OUTPT CLINIC VISIT: HCPCS

## 2020-02-19 PROCEDURE — 99202 OFFICE O/P NEW SF 15 MIN: CPT

## 2020-02-19 NOTE — UC
Hand/Wrist HPI





- HPI Summary


HPI Summary: 


PATIENT STRUCK HER RIGHT WRIST ON A METAL DOOR FRAME ABOUT A WEEK AGO.  

SYMPTOMS WERE IMPROVING BUT THEN YESTERDAY WAS WORKING OUT AND DOING PLANKS AND 

PUSHUPS.  SINCE THEN SHE HAS NOTICED INCREASED PAIN AND FULLNESS AT THE INJURED 

AREA.





- History Of Current Complaint


Chief Complaint: UCUpperExtremity


Stated Complaint: RIGHT WRIST INJURY


Time Seen by Provider: 02/19/20 19:28


Hx Obtained From: Patient


Hx Last Menstrual Period: 020420


Onset/Duration: Sudden Onset, Lasting Days, Still Present


Severity Initially: Moderate


Severity Currently: Moderate


Pain Intensity: 3


Pain Scale Used: 0-10 Numeric


Character Of Pain: Sharp


Aggravating Factor(s): Movement


Alleviating Factor(s): Rest


Associated Signs And Symptoms: Negative: Numbness/Tingling


Related History: Dominant Hand Right





- Allergies/Home Medications


Allergies/Adverse Reactions: 


 Allergies











Allergy/AdvReac Type Severity Reaction Status Date / Time


 


codeine Allergy  Vomiting Verified 02/19/20 19:19











Home Medications: 


 Home Medications





Oral Birth Control 1 tab PO DAILY 02/19/20 [History]











PMH/Surg Hx/FS Hx/Imm Hx


Previously Healthy: Yes





- Surgical History


Surgical History: Yes


Surgery Procedure, Year, and Place: orthopedic repairs





- Family History


Known Family History: Positive: Non-Contributory





- Social History


Alcohol Use: Occasionally


Substance Use Type: None


Smoking Status (MU): Never Smoked Tobacco





Review of Systems


All Other Systems Reviewed And Are Negative: Yes


Constitutional: Positive: Negative


Skin: Positive: Negative


Respiratory: Positive: Negative


Cardiovascular: Positive: Negative


Gastrointestinal: Positive: Negative


Musculoskeletal: Positive: Arthralgia, Decreased ROM





Physical Exam


Triage Information Reviewed: Yes


Appearance: Well-Appearing, No Pain Distress, Well-Nourished


Vital Signs: 


 Initial Vital Signs











Temp  97.9 F   02/19/20 19:14


 


Pulse  66   02/19/20 19:14


 


Resp  16   02/19/20 19:14


 


BP  107/67   02/19/20 19:14


 


Pulse Ox  100   02/19/20 19:14











Vital Signs Reviewed: Yes


Eyes: Positive: Conjunctiva Clear


ENT: Positive: Hearing grossly normal


Neck: Positive: Supple


Respiratory: Positive: No respiratory distress, No accessory muscle use


Cardiovascular: Positive: Pulses Normal


Abdomen Description: Positive: Soft


Musculoskeletal: Positive: No Edema, ROM Limited @ - RIGHT WRIST EXTENSION, 

Other: - TTP RIGHT WRIST OVERLYING PISIFORM AND FLEXOR TENDONS


Neurological: Positive: Alert


Psychological: Positive: Age Appropriate Behavior


Skin: Negative: Rashes





Diagnostics





- Radiology


  ** RIGHT WRIST XRAYS


Radiology Interpretation Completed By: ED Physician


Summary of Radiographic Findings: NO FRACTURE





Hand/Wrist Course/Dx





- Course


Course Of Treatment: 





RIGHT WRIST X-RAY IS UNREMARKABLE ON MY INITIAL INTERPRETATION.  RADIOLOGY READ 

PENDING.  PATIENT LIKELY STRAINED HER FLEXOR TENDONS YESTERDAY WHILE WORKING 

OUT.  COCK-UP SPLINT APPLIED BY RN TO HELP OFFLOAD PRESSURE AND PROVIDE SOME 

SUPPORT.  SYMPTOMS SHOULD IMPROVE OVER THE NEXT COUPLE OF WEEKS.  IF NOT FOLLOW-

UP WITH ORTHOPEDICS.  OTC MEDICATIONS AS NEEDED FOR DISCOMFORT.





- Differential Dx/Diagnosis


Provider Diagnosis: 


 Right wrist tendonitis








Discharge ED





- Sign-Out/Discharge


Documenting (check all that apply): Patient Departure


All imaging exams completed and their final reports reviewed: No





- Discharge Plan


Condition: Stable


Disposition: HOME


Patient Education Materials:  Tendinitis (ED)


Referrals: 


Nancy Brown MD [Medical Doctor] - If Needed


Additional Instructions: 


X-RAY OF YOUR RIGHT WRIST TODAY UNREMARKABLE FOR FRACTURE OR DISLOCATION ON MY 

INITIAL INTERPRETATION.  WE WILL CALL YOU TOMORROW IF THE RADIOLOGY READ 

DIFFERS.  I SUSPECT YOU HAVE DEVELOPED A TENDINITIS IN YOUR RIGHT WRIST.  WEAR 

THE SPLINT AT NIGHT WHILE SLEEPING AND DURING THE DAY AS ABLE TO HELP OFFLOAD 

PRESSURE AND TO REDUCE REPETITIVE MOVEMENTS WHICH MAY MAKE YOUR SYMPTOMS WORSE.

  OTC ANTI-INFLAMMATORIES AS NEEDED FOR DISCOMFORT.  FOLLOW-UP WITH ORTHOPEDICS 

IF YOUR SYMPTOMS DO NOT IMPROVE OVER THE NEXT COUPLE OF WEEKS.





- Billing Disposition and Condition


Condition: STABLE


Disposition: Home

## 2020-02-20 NOTE — [facility]
- Progress Note


Progress Note: 


Final radiologist reading of right wrist x-ray from February 19, 2020 is no 

fracture.





Provider interpretation same is same therefore there is no discrepancy.





Course/Dx





- Diagnoses


Provider Diagnoses: 


 Right wrist tendonitis








Discharge ED





- Sign-Out/Discharge


Documenting (check all that apply): Patient Departure


All imaging exams completed and their final reports reviewed: Yes





- Discharge Plan


Condition: Stable


Disposition: HOME


Patient Education Materials:  Tendinitis (ED)


Referrals: 


Nancy Brown MD [Medical Doctor] - If Needed


Additional Instructions: 


X-RAY OF YOUR RIGHT WRIST TODAY UNREMARKABLE FOR FRACTURE OR DISLOCATION ON MY 

INITIAL INTERPRETATION.  WE WILL CALL YOU TOMORROW IF THE RADIOLOGY READ 

DIFFERS.  I SUSPECT YOU HAVE DEVELOPED A TENDINITIS IN YOUR RIGHT WRIST.  WEAR 

THE SPLINT AT NIGHT WHILE SLEEPING AND DURING THE DAY AS ABLE TO HELP OFFLOAD 

PRESSURE AND TO REDUCE REPETITIVE MOVEMENTS WHICH MAY MAKE YOUR SYMPTOMS WORSE.

  OTC ANTI-INFLAMMATORIES AS NEEDED FOR DISCOMFORT.  FOLLOW-UP WITH ORTHOPEDICS 

IF YOUR SYMPTOMS DO NOT IMPROVE OVER THE NEXT COUPLE OF WEEKS.





- Billing Disposition and Condition


Condition: STABLE


Disposition: Home

## (undated) DEVICE — BANDAGE COBAN 4 IN COMPR W4INXL5YD FOAM COHESIVE QUIK STK SELF ADH SFT

## (undated) DEVICE — CANNULA ARTHSCP L7CM DIA7MM TRNSLUC THRD FLX W/ NO SQUIRT

## (undated) DEVICE — SPONGE GZ W4XL4IN COT 12 PLY TYP VII WVN C FLD DSGN

## (undated) DEVICE — 3M™ STERI-DRAPE™ U-DRAPE 1015: Brand: STERI-DRAPE™

## (undated) DEVICE — TUBING PMP L16FT MAIN DISP FOR AR-6400 AR-6475

## (undated) DEVICE — 3M™ TEGADERM™ TRANSPARENT FILM DRESSING FRAME STYLE, 1629, 8 IN X 12 IN (20 CM X 30 CM), 10/CT 8CT/CASE: Brand: 3M™ TEGADERM™

## (undated) DEVICE — GLOVE SURG SZ 75 L12IN FNGR THK87MIL DK GRN LTX FREE ISOLEX

## (undated) DEVICE — SOLUTION IV IRRIG 0.9% NACL 3000ML BAG 2B7477

## (undated) DEVICE — CHLORAPREP 26ML ORANGE

## (undated) DEVICE — TUBING, SUCTION, 9/32" X 12', STRAIGHT: Brand: MEDLINE INDUSTRIES, INC.

## (undated) DEVICE — PASSER SUT 90DEG STR SUTLASSO SD

## (undated) DEVICE — DRIP REDUCTION MANIFOLD

## (undated) DEVICE — SOLUTION IV IRRIG POUR BRL 0.9% SODIUM CHL 2F7124

## (undated) DEVICE — GOWN,REINF,POLY,AURORA,XLNG/XL,STRL: Brand: MEDLINE

## (undated) DEVICE — [TOMCAT CUTTER, ARTHROSCOPIC SHAVER BLADE,  DO NOT RESTERILIZE,  DO NOT USE IF PACKAGE IS DAMAGED,  KEEP DRY,  KEEP AWAY FROM SUNLIGHT]: Brand: FORMULA

## (undated) DEVICE — DRESSING PETRO W3XL8IN OIL EMUL N ADH GZ KNIT IMPREG CELOS

## (undated) DEVICE — 3M™ IOBAN™ 2 ANTIMICROBIAL INCISE DRAPE 6648EZ: Brand: IOBAN™ 2

## (undated) DEVICE — GLOVE SURG SZ 75 L12IN FNGR THK87MIL WHT LTX FREE

## (undated) DEVICE — SUTURE NONABSORBABLE MONOFILAMENT 3-0 PS-1 18 IN BLK ETHILON 1663H

## (undated) DEVICE — NEEDLE SPNL L3.5IN PNK HUB S STL REG WALL FIT STYL W/ QNCKE

## (undated) DEVICE — SUTURE LABRALTAPE L36IN DIA1.5MM NONABSORBABLE WHT BLK AR7276T

## (undated) DEVICE — ELECTRODE PT RET AD L9FT HI MOIST COND ADH HYDRGEL CORDED

## (undated) DEVICE — RESTRAINT POS UNIV HD NK ALIGN DISP FOR SHLDR PROC

## (undated) DEVICE — PROTECTOR EYE PT SELF ADH NS OPT GRD LF

## (undated) DEVICE — INTENDED FOR TISSUE SEPARATION, AND OTHER PROCEDURES THAT REQUIRE A SHARP SURGICAL BLADE TO PUNCTURE OR CUT.: Brand: BARD-PARKER ® CARBON RIB-BACK BLADES

## (undated) DEVICE — SYRINGE MED 20ML STD CLR PLAS LUERLOCK TIP N CTRL DISP

## (undated) DEVICE — SPONGE LAP W18XL18IN WHT COT 4 PLY FLD STRUNG RADPQ DISP ST

## (undated) DEVICE — BASIC PACK: Brand: MEDLINE INDUSTRIES, INC.

## (undated) DEVICE — PAD,ABDOMINAL,8"X10",ST,LF: Brand: MEDLINE

## (undated) DEVICE — PACK,SHOULDER III,DRAPE,10/CS: Brand: MEDLINE

## (undated) DEVICE — WRAP POS SUPP DISP FOR L ARM

## (undated) DEVICE — Z DISCONTINUED PER MEDLINE USE 2741942 DRESSING AQUACEL 6 IN ALG W9XL15CM SIL CVR WTRPRF VIR BACT BARR ANTIMIC

## (undated) DEVICE — 35 ML SYRINGE LUER-LOCK TIP: Brand: MONOJECT

## (undated) DEVICE — [AGGRESSIVE PLUS CUTTER, ARTHROSCOPIC SHAVER BLADE,  DO NOT RESTERILIZE,  DO NOT USE IF PACKAGE IS DAMAGED,  KEEP DRY,  KEEP AWAY FROM SUNLIGHT]: Brand: FORMULA